# Patient Record
Sex: FEMALE | Race: BLACK OR AFRICAN AMERICAN | NOT HISPANIC OR LATINO | Employment: FULL TIME
[De-identification: names, ages, dates, MRNs, and addresses within clinical notes are randomized per-mention and may not be internally consistent; named-entity substitution may affect disease eponyms.]

---

## 2017-07-08 ENCOUNTER — HEALTH MAINTENANCE LETTER (OUTPATIENT)
Age: 37
End: 2017-07-08

## 2018-07-15 ENCOUNTER — HEALTH MAINTENANCE LETTER (OUTPATIENT)
Age: 38
End: 2018-07-15

## 2019-10-04 ENCOUNTER — HEALTH MAINTENANCE LETTER (OUTPATIENT)
Age: 39
End: 2019-10-04

## 2020-11-08 ENCOUNTER — HEALTH MAINTENANCE LETTER (OUTPATIENT)
Age: 40
End: 2020-11-08

## 2021-09-12 ENCOUNTER — HEALTH MAINTENANCE LETTER (OUTPATIENT)
Age: 41
End: 2021-09-12

## 2022-01-01 ENCOUNTER — HEALTH MAINTENANCE LETTER (OUTPATIENT)
Age: 42
End: 2022-01-01

## 2022-10-30 ENCOUNTER — HEALTH MAINTENANCE LETTER (OUTPATIENT)
Age: 42
End: 2022-10-30

## 2023-04-08 ENCOUNTER — HEALTH MAINTENANCE LETTER (OUTPATIENT)
Age: 43
End: 2023-04-08

## 2023-08-20 ENCOUNTER — HOSPITAL ENCOUNTER (EMERGENCY)
Facility: CLINIC | Age: 43
Discharge: HOME | End: 2023-08-20
Attending: NURSE PRACTITIONER | Admitting: NURSE PRACTITIONER
Payer: COMMERCIAL

## 2023-08-20 VITALS
RESPIRATION RATE: 18 BRPM | TEMPERATURE: 97.7 F | OXYGEN SATURATION: 99 % | SYSTOLIC BLOOD PRESSURE: 108 MMHG | DIASTOLIC BLOOD PRESSURE: 69 MMHG | HEART RATE: 85 BPM | WEIGHT: 162 LBS

## 2023-08-20 DIAGNOSIS — D50.9 IRON DEFICIENCY ANEMIA, UNSPECIFIED IRON DEFICIENCY ANEMIA TYPE: ICD-10-CM

## 2023-08-20 LAB
ABO/RH(D): NORMAL
ALBUMIN SERPL BCG-MCNC: 3.9 G/DL (ref 3.5–5.2)
ALP SERPL-CCNC: 67 U/L (ref 35–104)
ALT SERPL W P-5'-P-CCNC: 111 U/L (ref 0–50)
ANION GAP SERPL CALCULATED.3IONS-SCNC: 9 MMOL/L (ref 7–15)
ANTIBODY SCREEN: NEGATIVE
AST SERPL W P-5'-P-CCNC: 158 U/L (ref 0–45)
BASOPHILS # BLD AUTO: 0 10E3/UL (ref 0–0.2)
BASOPHILS NFR BLD AUTO: 1 %
BILIRUB SERPL-MCNC: 0.4 MG/DL
BUN SERPL-MCNC: 4.9 MG/DL (ref 6–20)
CALCIUM SERPL-MCNC: 9.4 MG/DL (ref 8.6–10)
CHLORIDE SERPL-SCNC: 105 MMOL/L (ref 98–107)
CREAT SERPL-MCNC: 0.72 MG/DL (ref 0.51–0.95)
DACRYOCYTES BLD QL SMEAR: SLIGHT
DEPRECATED HCO3 PLAS-SCNC: 27 MMOL/L (ref 22–29)
EOSINOPHIL # BLD AUTO: 0.1 10E3/UL (ref 0–0.7)
EOSINOPHIL NFR BLD AUTO: 3 %
ERYTHROCYTE [DISTWIDTH] IN BLOOD BY AUTOMATED COUNT: 22.7 % (ref 10–15)
FERRITIN SERPL-MCNC: 9 NG/ML (ref 6–175)
FOLATE SERPL-MCNC: NORMAL NG/ML
GFR SERPL CREATININE-BSD FRML MDRD: >90 ML/MIN/1.73M2
GLUCOSE SERPL-MCNC: 111 MG/DL (ref 70–99)
HCT VFR BLD AUTO: 22.3 % (ref 35–47)
HEMOCCULT STL QL: NEGATIVE
HGB BLD-MCNC: 6.3 G/DL (ref 11.7–15.7)
HOLD SPECIMEN: NORMAL
IMM GRANULOCYTES # BLD: 0 10E3/UL
IMM GRANULOCYTES NFR BLD: 1 %
INR PPP: 0.99 (ref 0.85–1.15)
IRON BINDING CAPACITY (ROCHE): 367 UG/DL (ref 240–430)
IRON SATN MFR SERPL: 4 % (ref 15–46)
IRON SERPL-MCNC: 16 UG/DL (ref 37–145)
LIPASE SERPL-CCNC: 155 U/L (ref 13–60)
LYMPHOCYTES # BLD AUTO: 1 10E3/UL (ref 0.8–5.3)
LYMPHOCYTES NFR BLD AUTO: 26 %
MAGNESIUM SERPL-MCNC: 1.5 MG/DL (ref 1.7–2.3)
MCH RBC QN AUTO: 15.7 PG (ref 26.5–33)
MCHC RBC AUTO-ENTMCNC: 28.3 G/DL (ref 31.5–36.5)
MCV RBC AUTO: 56 FL (ref 78–100)
MONOCYTES # BLD AUTO: 0.6 10E3/UL (ref 0–1.3)
MONOCYTES NFR BLD AUTO: 16 %
NEUTROPHILS # BLD AUTO: 2 10E3/UL (ref 1.6–8.3)
NEUTROPHILS NFR BLD AUTO: 53 %
NRBC # BLD AUTO: 0 10E3/UL
NRBC BLD AUTO-RTO: 1 /100
PLAT MORPH BLD: ABNORMAL
PLATELET # BLD AUTO: 187 10E3/UL (ref 150–450)
POTASSIUM SERPL-SCNC: 3.7 MMOL/L (ref 3.4–5.3)
PROT SERPL-MCNC: 6.5 G/DL (ref 6.4–8.3)
RBC # BLD AUTO: 4.01 10E6/UL (ref 3.8–5.2)
RBC MORPH BLD: ABNORMAL
RETICS # AUTO: 0.07 10E6/UL (ref 0.03–0.1)
RETICS/RBC NFR AUTO: 1.7 % (ref 0.5–2)
SODIUM SERPL-SCNC: 141 MMOL/L (ref 136–145)
SPECIMEN EXPIRATION DATE: NORMAL
TARGETS BLD QL SMEAR: ABNORMAL
TRANSFERRIN SERPL-MCNC: 307 MG/DL (ref 200–360)
VIT B12 SERPL-MCNC: 1299 PG/ML (ref 232–1245)
WBC # BLD AUTO: 3.6 10E3/UL (ref 4–11)

## 2023-08-20 PROCEDURE — 85610 PROTHROMBIN TIME: CPT | Performed by: NURSE PRACTITIONER

## 2023-08-20 PROCEDURE — 86850 RBC ANTIBODY SCREEN: CPT | Performed by: NURSE PRACTITIONER

## 2023-08-20 PROCEDURE — 83690 ASSAY OF LIPASE: CPT | Performed by: NURSE PRACTITIONER

## 2023-08-20 PROCEDURE — 85025 COMPLETE CBC W/AUTO DIFF WBC: CPT | Performed by: NURSE PRACTITIONER

## 2023-08-20 PROCEDURE — 36415 COLL VENOUS BLD VENIPUNCTURE: CPT | Performed by: NURSE PRACTITIONER

## 2023-08-20 PROCEDURE — 83735 ASSAY OF MAGNESIUM: CPT | Performed by: NURSE PRACTITIONER

## 2023-08-20 PROCEDURE — 82728 ASSAY OF FERRITIN: CPT | Performed by: NURSE PRACTITIONER

## 2023-08-20 PROCEDURE — 83550 IRON BINDING TEST: CPT | Performed by: NURSE PRACTITIONER

## 2023-08-20 PROCEDURE — 85045 AUTOMATED RETICULOCYTE COUNT: CPT | Performed by: NURSE PRACTITIONER

## 2023-08-20 PROCEDURE — 258N000003 HC RX IP 258 OP 636: Performed by: NURSE PRACTITIONER

## 2023-08-20 PROCEDURE — 82272 OCCULT BLD FECES 1-3 TESTS: CPT | Performed by: NURSE PRACTITIONER

## 2023-08-20 PROCEDURE — 36415 COLL VENOUS BLD VENIPUNCTURE: CPT | Performed by: STUDENT IN AN ORGANIZED HEALTH CARE EDUCATION/TRAINING PROGRAM

## 2023-08-20 PROCEDURE — 96365 THER/PROPH/DIAG IV INF INIT: CPT

## 2023-08-20 PROCEDURE — 82746 ASSAY OF FOLIC ACID SERUM: CPT | Performed by: NURSE PRACTITIONER

## 2023-08-20 PROCEDURE — 99284 EMERGENCY DEPT VISIT MOD MDM: CPT | Performed by: EMERGENCY MEDICINE

## 2023-08-20 PROCEDURE — 80053 COMPREHEN METABOLIC PANEL: CPT | Performed by: NURSE PRACTITIONER

## 2023-08-20 PROCEDURE — 82607 VITAMIN B-12: CPT | Performed by: NURSE PRACTITIONER

## 2023-08-20 PROCEDURE — 84466 ASSAY OF TRANSFERRIN: CPT | Performed by: NURSE PRACTITIONER

## 2023-08-20 PROCEDURE — 99284 EMERGENCY DEPT VISIT MOD MDM: CPT | Mod: 25

## 2023-08-20 PROCEDURE — 250N000011 HC RX IP 250 OP 636: Mod: JZ | Performed by: NURSE PRACTITIONER

## 2023-08-20 RX ORDER — FERROUS SULFATE 325(65) MG
325 TABLET ORAL
Qty: 30 TABLET | Refills: 1 | Status: SHIPPED | OUTPATIENT
Start: 2023-08-20 | End: 2024-02-07

## 2023-08-20 RX ORDER — DIPHENHYDRAMINE HYDROCHLORIDE 50 MG/ML
50 INJECTION INTRAMUSCULAR; INTRAVENOUS
Status: DISCONTINUED | OUTPATIENT
Start: 2023-08-20 | End: 2023-08-20 | Stop reason: HOSPADM

## 2023-08-20 RX ORDER — METHYLPREDNISOLONE SODIUM SUCCINATE 125 MG/2ML
125 INJECTION, POWDER, LYOPHILIZED, FOR SOLUTION INTRAMUSCULAR; INTRAVENOUS
Status: DISCONTINUED | OUTPATIENT
Start: 2023-08-20 | End: 2023-08-20 | Stop reason: HOSPADM

## 2023-08-20 RX ADMIN — IRON SUCROSE 200 MG: 20 INJECTION, SOLUTION INTRAVENOUS at 14:36

## 2023-08-20 ASSESSMENT — ACTIVITIES OF DAILY LIVING (ADL)
ADLS_ACUITY_SCORE: 35
ADLS_ACUITY_SCORE: 35

## 2023-08-20 NOTE — ED TRIAGE NOTES
Sent from [facility] with reports of hgb 6.5 , pt denies any symptoms     Triage Assessment       Row Name 08/20/23 1033       Triage Assessment (Adult)    Airway WDL WDL       Respiratory WDL    Respiratory WDL WDL       Skin Circulation/Temperature WDL    Skin Circulation/Temperature WDL WDL       Cardiac WDL    Cardiac WDL WDL       Peripheral/Neurovascular WDL    Peripheral Neurovascular WDL WDL       Cognitive/Neuro/Behavioral WDL    Cognitive/Neuro/Behavioral WDL WDL

## 2023-08-20 NOTE — ED PROVIDER NOTES
History     Chief Complaint   Patient presents with    Abnormal Labs     HPI  Tasneem Hogan is a 43 year old female with history of interstitial lung disease, sarcoidosis, and sickle cell trait who presents from [facility] for evaluation of severe anemia.  Patient was admitted to [facility] on yesterday for chemical dependency treatment for alcohol.  Her last drink was on yesterday.  She has not had any complications or withdrawal. She had routine labs done that showed a hemoglobin of 6.5, MCV 60.1, normal platelets (200). She is asymptomatic with this.     Denies feeling lightheaded.  Denies chest pain or shortness of breath. Denies headaches.  Denies fatigue. Denies abdominal pain.  Denies black or bloody stool.  Denies nausea or vomiting or bloody emesis.    Patient has not seen a doctor since 2012. Hgb back then ranged from 10.5 - 12.2 (microcytic anemia). Per patient, she has previously been on oral iron supplement but has not been on iron in many years.     Allergies:  Allergies   Allergen Reactions    Nkda [No Known Drug Allergy]        Problem List:    Patient Active Problem List    Diagnosis Date Noted    Iron deficiency anemia, unspecified iron deficiency anemia type 08/20/2023     Priority: Medium    S/P LEEP of cervix 07/26/2012     Priority: Medium     2009      Sarcoidosis 04/12/2011     Priority: Medium     With lung nodules on CT, improving on last CT 2011      CARDIOVASCULAR SCREENING; LDL GOAL LESS THAN 160 05/09/2010     Priority: Medium    HSIL (high grade squamous intraepithelial lesion) on Pap smear      Priority: Medium     pap reread-- agree with initial read.  pap repeated 3-09 (6 months later): lgsil  colp: lesions, bx-- chronic cervicitis.  Referral to gyn  9/08: HSIL   3/09: LSIL cannot exclude HSIL ,3/09 colpo with bx showed all negative   10/09 colpo showed MICHAEL II-III , 11/09 LEEP with negative margins   6/10: normal pap  7/7/11: Pap - Nil.   Plan pap in 1 yr.  2012: NIL pap.  Plan pap in 1 yr.      Problem list name updated by automated process. Provider to review and confirm      Wheeze 2008     Priority: Medium        Past Medical History:    Past Medical History:   Diagnosis Date    HSIL (high grade squamous intraepithelial lesion) on Pap smear     Normal delivery 2000    Sarcoidosis     Sickle-cell trait (H)     Tobacco abuse 2008    Unspecified chlamydial infection, in conditions classified elsewhere and of unspecified site 3-01    Unspecified inflammatory disease of female pelvic organs and tissues 3-01       Past Surgical History:    Past Surgical History:   Procedure Laterality Date    LEEP TX, CERVICAL  2009    harriet II & III    ZC NONSPECIFIC PROCEDURE      - 2 children Male 12-, Female '       Family History:    Family History   Problem Relation Age of Onset    Cerebrovascular Disease Father        Social History:  Marital Status:  Single [1]  Social History     Tobacco Use    Smoking status: Former     Packs/day: 1.00     Types: Cigarettes     Quit date: 2012     Years since quittin.2    Smokeless tobacco: Never   Substance Use Topics    Alcohol use: Yes     Comment: Approx 1-2 times a week    Drug use: Yes        Medications:    ferrous sulfate (FEROSUL) 325 (65 Fe) MG tablet  albuterol (PROAIR HFA) 108 (90 BASE) MCG/ACT inhaler  Mometasone Furo-Formoterol Fum (DULERA IN)        Review of Systems  As mentioned above in the history present illness. All other systems were reviewed and are negative.    Physical Exam   BP: 103/59  Pulse: 92  Temp: 97.7  F (36.5  C)  Resp: 18  Weight: 73.5 kg (162 lb)  SpO2: 100 %      Physical Exam  Constitutional:       General: She is not in acute distress.     Appearance: Normal appearance. She is well-developed. She is not ill-appearing.   HENT:      Head: Normocephalic and atraumatic.      Right Ear: External ear normal.      Left Ear: External ear normal.       Nose: Nose normal.      Mouth/Throat:      Mouth: Mucous membranes are moist.   Eyes:      Conjunctiva/sclera: Conjunctivae normal.   Cardiovascular:      Rate and Rhythm: Normal rate and regular rhythm.      Heart sounds: Normal heart sounds. No murmur heard.  Pulmonary:      Effort: Pulmonary effort is normal. No respiratory distress.      Breath sounds: Normal breath sounds.   Abdominal:      General: Bowel sounds are normal. There is no distension.      Palpations: Abdomen is soft.      Tenderness: There is no abdominal tenderness.   Genitourinary:     Rectum: Guaiac result negative. No tenderness, anal fissure or external hemorrhoid.   Musculoskeletal:         General: Normal range of motion.   Skin:     General: Skin is warm and dry.      Coloration: Skin is pale.      Findings: No rash.   Neurological:      General: No focal deficit present.      Mental Status: She is alert and oriented to person, place, and time.         ED Course              ED Course as of 08/20/23 1511   Sun Aug 20, 2023   1311 Notified of Hgb 6.3   1346 Spoke with Vitor to give update.       Procedures              Results for orders placed or performed during the hospital encounter of 08/20/23 (from the past 24 hour(s))   [place] Draw    Narrative    The following orders were created for panel order [place] Draw.  Procedure                               Abnormality         Status                     ---------                               -----------         ------                     Extra Blue Top Tube[[id number]]                              Final result               Extra Green Top (Lithium...[[id number]]                      Final result               Extra Purple Top Tube[[id number]]                            Final result               Extra Blood Bank Purple ...[[id number]]                      Final result                 Please view results for these tests on the individual orders.   Extra Blue Top Tube   Result Value Ref  Range    Hold Specimen JIC    Extra Green Top (Lithium Heparin) Tube   Result Value Ref Range    Hold Specimen JIC    Extra Purple Top Tube   Result Value Ref Range    Hold Specimen JIC    Extra Blood Bank Purple Top Tube   Result Value Ref Range    Hold Specimen JIC    CBC with platelets differential    Narrative    The following orders were created for panel order CBC with platelets differential.  Procedure                               Abnormality         Status                     ---------                               -----------         ------                     CBC with platelets and d...[[id number]]  Abnormal            Final result               RBC and Platelet Morphology[[id number]]  Abnormal            Final result                 Please view results for these tests on the individual orders.   Comprehensive metabolic panel   Result Value Ref Range    Sodium 141 136 - 145 mmol/L    Potassium 3.7 3.4 - 5.3 mmol/L    Chloride 105 98 - 107 mmol/L    Carbon Dioxide (CO2) 27 22 - 29 mmol/L    Anion Gap 9 7 - 15 mmol/L    Urea Nitrogen 4.9 (L) 6.0 - 20.0 mg/dL    Creatinine 0.72 0.51 - 0.95 mg/dL    Calcium 9.4 8.6 - 10.0 mg/dL    Glucose 111 (H) 70 - 99 mg/dL    Alkaline Phosphatase 67 35 - 104 U/L     (H) 0 - 45 U/L     (H) 0 - 50 U/L    Protein Total 6.5 6.4 - 8.3 g/dL    Albumin 3.9 3.5 - 5.2 g/dL    Bilirubin Total 0.4 <=1.2 mg/dL    GFR Estimate >90 >60 mL/min/1.73m2   ABO/Rh type and screen    Narrative    The following orders were created for panel order ABO/Rh type and screen.  Procedure                               Abnormality         Status                     ---------                               -----------         ------                     Adult Type and Screen[[id number]]                            Edited Result - FINAL        Please view results for these tests on the individual orders.   INR   Result Value Ref Range    INR 0.99 0.85 - 1.15   Lipase   Result Value Ref Range     Lipase 155 (H) 13 - 60 U/L   Iron and iron binding capacity   Result Value Ref Range    Iron 16 (L) 37 - 145 ug/dL    Iron Binding Capacity 367 240 - 430 ug/dL    Iron Sat Index 4 (L) 15 - 46 %   Reticulocyte count   Result Value Ref Range    % Reticulocyte 1.7 0.5 - 2.0 %    Absolute Reticulocyte 0.069 0.025 - 0.095 10e6/uL   Ferritin   Result Value Ref Range    Ferritin 9 6 - 175 ng/mL   Magnesium   Result Value Ref Range    Magnesium 1.5 (L) 1.7 - 2.3 mg/dL   CBC with platelets and differential   Result Value Ref Range    WBC Count 3.6 (L) 4.0 - 11.0 10e3/uL    RBC Count 4.01 3.80 - 5.20 10e6/uL    Hemoglobin 6.3 (LL) 11.7 - 15.7 g/dL    Hematocrit 22.3 (L) 35.0 - 47.0 %    MCV 56 (L) 78 - 100 fL    MCH 15.7 (L) 26.5 - 33.0 pg    MCHC 28.3 (L) 31.5 - 36.5 g/dL    RDW 22.7 (H) 10.0 - 15.0 %    Platelet Count 187 150 - 450 10e3/uL    % Neutrophils 53 %    % Lymphocytes 26 %    % Monocytes 16 %    % Eosinophils 3 %    % Basophils 1 %    % Immature Granulocytes 1 %    NRBCs per 100 WBC 1 (H) <1 /100    Absolute Neutrophils 2.0 1.6 - 8.3 10e3/uL    Absolute Lymphocytes 1.0 0.8 - 5.3 10e3/uL    Absolute Monocytes 0.6 0.0 - 1.3 10e3/uL    Absolute Eosinophils 0.1 0.0 - 0.7 10e3/uL    Absolute Basophils 0.0 0.0 - 0.2 10e3/uL    Absolute Immature Granulocytes 0.0 <=0.4 10e3/uL    Absolute NRBCs 0.0 10e3/uL   Adult Type and Screen   Result Value Ref Range    ABO/RH(D) A POS     Antibody Screen Negative Negative    SPECIMEN EXPIRATION DATE [id number]    RBC and Platelet Morphology   Result Value Ref Range    Platelet Assessment  Automated Count Confirmed. Platelet morphology is normal.     Automated Count Confirmed. Platelet morphology is normal.    Target Cells Marked (A) None Seen    Teardrop Cells Slight (A) None Seen    RBC Morphology Confirmed RBC Indices    Occult blood stool   Result Value Ref Range    Occult Blood Negative Negative       Medications   EPINEPHrine (ADRENALIN) kit 0.3 mg (has no administration in  time range)   diphenhydrAMINE (BENADRYL) injection 50 mg (has no administration in time range)   methylPREDNISolone sodium succinate (solu-MEDROL) injection 125 mg (has no administration in time range)   famotidine (PEPCID) injection 20 mg (has no administration in time range)   iron sucrose (VENOFER) 200 mg in sodium chloride 0.9 % 120 mL intermittent infusion (0 mg Intravenous Stopped 8/20/23 7132)       Assessments & Plan (with Medical Decision Making)  I consulted with Dr. Ortega, emergency physician, regarding the work-up and management of this patient.   43 year old female with history of interstitial lung disease, sarcoidosis, and sickle cell trait who presents from [facility] for evaluation of severe anemia.  Patient was admitted to [facility] on Friday for chemical dependency treatment for alcohol.  Her last drink was on Friday.  She has not had any complications or withdrawal. She had routine labs done that showed a hemoglobin of 6.5, MCV 60.1, normal platelets (200). She is asymptomatic with this.     Denies feeling lightheaded.  Denies chest pain or shortness of breath. Denies headaches.  Denies fatigue. Denies abdominal pain.  Denies black or bloody stool.  Denies nausea or vomiting or bloody emesis.    Patient has not seen a doctor since 2012. Hgb back then ranged from 10.5 - 12.2 (microcytic anemia). Per patient, she has previously been on oral iron supplement but has not been on iron in many years.     Patient is alert and oriented.  She does not appear in any distress.  She is pale.  Normotensive.  No tachycardia.  She is afebrile.  No concerning exam findings.      Pertinent lab findings:    WBC 3.6  Hemoglobin 6.3, MCV 56, marked target cells, slight teardrop cells  Platelets normal.  Magnesium low at 1.5. (she is getting magnesium supplementation at [facility])  Iron is low at 16 and iron saturation index is low at 4  -- Normal ferritin, normal retic count  Mild  elevation of , and , lipase 155 (likely related to her alcohol abuse)  --normal INR      I consulted with hematology at [facility], Dr. Greri Khan. Given patient is asymptomatic there is no strong indication for blood transfusion.  However, he recommends iron infusions (Venofer 200 mg for 5 doses).  If we can give her the first dose here in the emergency department and then have her get set up for additional doses outpatient would be ideal.  Also, instructed to add B12 and folate to her lab work-up.  She should have follow-up with hematology in the next 2 weeks.    Transferrin, B12, and Folate result is pending at time of discharge.    Patient received IV Venofer 200 mg here today.  She tolerated this very well with no adverse reaction.    Plan:  You were given an iron infusion (Venofer) here today.  Start ferrous sulfate 325 mg daily.    You are going to need additional iron infusions over the next couple weeks, for a total of 5 infusions.    Make a clinic appointment this week so they can assist in placing orders for iron infusion to be done at the infusion therapy clinic.  Call [phone] to scheduled clinic appointment.    Schedule follow-up appointment with hematology.  Referral sent.  [phone]     New Prescriptions    FERROUS SULFATE (FEROSUL) 325 (65 FE) MG TABLET    Take 1 tablet (325 mg) by mouth daily (with breakfast)       Final diagnoses:   Iron deficiency anemia, unspecified iron deficiency anemia type       8/20/2023   [facility] EMERGENCY DEPT       Felisa, Naye DiazFRANCA CNP  08/20/23 2321    ED Attending Physician Attestation   I, Steve Ortega MD evaluated and cared for the patient as part of a shared visit with the Advanced Practice Provider (ISABEL). I have performed a history and physical examination of the patient independent of the ISABEL. I reviewed the ISABEL's documentation above and agree with the documented findings and plan of care. I personally  provided a substantive portion of the care for this patient, including the complete Medical Decision Making. Please see the ISABEL's documentation for full details.  I have reviewed and discussed with the advanced practice provider their history, physical and plan.  We reviewed the patient's evaluation, medical decision making and treatment plan. I personally reviewed the vital signs, medications, and labs.  I spent 10 minutes face-to-face and/or coordinating care. Over 50% of our time on the unit was spent counseling the patient and/or coordinating care regarding her diagnosis and treatment.       Summary of HPI, PE, ED Course  My key history or physical exam findings:   43 year old female with history of sickle cell trait who presents from [facility] for evaluation of severe anemia with routine admission labs showing Hgb 6.5, MCV 60.1. Asymptomatic. No primary care since 2012 when Hgb was 10.5 - 12.2 (microcytic anemia) and she was on an iron supplement. No iron supplement for many years. Exam notable for NL exam.   ED course notable for: Hematology consult, IV iron, deferral of transfusion.     Critical Care & Procedures  Not applicable.    Medical Decision Making  Key management decisions made by me: Hematology consult for care/treatment guidance.  Medical Decision Making The medical record was reviewed and interpreted.  Current labs reviewed and interpreted.  Previous labs reviewed and interpreted.    Disposition  After the completion of care in the emergency department, the patient was discharged.    I agree with the PA's evaluation, assessment and treatment plan.  Face to face time with the patient = 10 minutes     Steve Ortega MD  Date of Service (when I saw the patient): 8/20/23       Steve Ortega MD  08/22/23 0003

## 2023-08-21 ENCOUNTER — PATIENT OUTREACH (OUTPATIENT)
Dept: ONCOLOGY | Facility: CLINIC | Age: 43
End: 2023-08-21
Payer: COMMERCIAL

## 2023-08-21 NOTE — PROGRESS NOTES
Hematology referral reviewed for [facility], see below.    Referral reason: Anemia, seen by ED provider with below rec's from Dr Khan, currently admitted to [facility] for chemical dependency treatment.    Current abnormal labs: Available in Chart Review    Outreach: Call not placed to patient regarding referral.    Plan: Triage instructions updated and sent to NPS for completion.

## 2023-08-22 ENCOUNTER — TELEPHONE (OUTPATIENT)
Dept: EMERGENCY MEDICINE | Facility: CLINIC | Age: 43
End: 2023-08-22
Payer: COMMERCIAL

## 2023-08-22 NOTE — TELEPHONE ENCOUNTER
[facility] Emergency Department/Urgent Care Lab result notification:    Reason for call  Call to [facility] to obtain nursing department fax number to forward lab results  Can fax results to [facility] per [place], [phone]    Faxed labs.    Joslyn Dunn, RN  Customer Service Center Result RN  [facility] Emergency Dept Lab Result RN  Ph# [phone]

## 2023-09-07 NOTE — TELEPHONE ENCOUNTER
RECORDS STATUS - ALL OTHER DIAGNOSIS      RECORDS RECEIVED FROM: Epic   DATE RECEIVED:    NOTES STATUS DETAILS   OFFICE NOTE from referring provider Epic 8/20/23: FRANCA Lagunas CNP   DISCHARGE REPORT from the ER [facility] 8/20/23: [facility] [place] [facility]   MEDICATION LIST [facility]    LABS     ANYTHING RELATED TO DIAGNOSIS Epic

## 2023-09-20 ENCOUNTER — PRE VISIT (OUTPATIENT)
Dept: ONCOLOGY | Facility: CLINIC | Age: 43
End: 2023-09-20
Payer: COMMERCIAL

## 2023-09-20 ENCOUNTER — ONCOLOGY VISIT (OUTPATIENT)
Dept: ONCOLOGY | Facility: CLINIC | Age: 43
End: 2023-09-20
Attending: NURSE PRACTITIONER
Payer: COMMERCIAL

## 2023-09-20 ENCOUNTER — LAB (OUTPATIENT)
Dept: INFUSION THERAPY | Facility: CLINIC | Age: 43
End: 2023-09-20
Attending: INTERNAL MEDICINE
Payer: COMMERCIAL

## 2023-09-20 VITALS
HEART RATE: 67 BPM | HEIGHT: 66 IN | DIASTOLIC BLOOD PRESSURE: 73 MMHG | BODY MASS INDEX: 27 KG/M2 | OXYGEN SATURATION: 100 % | SYSTOLIC BLOOD PRESSURE: 110 MMHG | WEIGHT: 168 LBS | RESPIRATION RATE: 16 BRPM

## 2023-09-20 DIAGNOSIS — D50.9 MICROCYTIC ANEMIA: ICD-10-CM

## 2023-09-20 DIAGNOSIS — D50.9 MICROCYTIC ANEMIA: Primary | ICD-10-CM

## 2023-09-20 DIAGNOSIS — D50.9 IRON DEFICIENCY ANEMIA, UNSPECIFIED IRON DEFICIENCY ANEMIA TYPE: ICD-10-CM

## 2023-09-20 LAB
ALBUMIN SERPL BCG-MCNC: 4.3 G/DL (ref 3.5–5.2)
ALP SERPL-CCNC: 53 U/L (ref 35–104)
ALT SERPL W P-5'-P-CCNC: <5 U/L (ref 0–50)
ANION GAP SERPL CALCULATED.3IONS-SCNC: 9 MMOL/L (ref 7–15)
AST SERPL W P-5'-P-CCNC: 21 U/L (ref 0–45)
BASOPHILS # BLD AUTO: 0 10E3/UL (ref 0–0.2)
BASOPHILS NFR BLD AUTO: 1 %
BILIRUB SERPL-MCNC: 0.2 MG/DL
BUN SERPL-MCNC: 6.5 MG/DL (ref 6–20)
CALCIUM SERPL-MCNC: 9.4 MG/DL (ref 8.6–10)
CHLORIDE SERPL-SCNC: 105 MMOL/L (ref 98–107)
CREAT SERPL-MCNC: 0.84 MG/DL (ref 0.51–0.95)
DACRYOCYTES BLD QL SMEAR: SLIGHT
DEPRECATED HCO3 PLAS-SCNC: 25 MMOL/L (ref 22–29)
EGFRCR SERPLBLD CKD-EPI 2021: 87 ML/MIN/1.73M2
EOSINOPHIL # BLD AUTO: 0.2 10E3/UL (ref 0–0.7)
EOSINOPHIL NFR BLD AUTO: 4 %
ERYTHROCYTE [DISTWIDTH] IN BLOOD BY AUTOMATED COUNT: 27.8 % (ref 10–15)
FERRITIN SERPL-MCNC: 43 NG/ML (ref 6–175)
GLUCOSE SERPL-MCNC: 92 MG/DL (ref 70–99)
HCT VFR BLD AUTO: 36.9 % (ref 35–47)
HGB BLD-MCNC: 11.1 G/DL (ref 11.7–15.7)
IMM GRANULOCYTES # BLD: 0 10E3/UL
IMM GRANULOCYTES NFR BLD: 0 %
LYMPHOCYTES # BLD AUTO: 1.1 10E3/UL (ref 0.8–5.3)
LYMPHOCYTES NFR BLD AUTO: 25 %
MCH RBC QN AUTO: 20.9 PG (ref 26.5–33)
MCHC RBC AUTO-ENTMCNC: 30.1 G/DL (ref 31.5–36.5)
MCV RBC AUTO: 70 FL (ref 78–100)
MONOCYTES # BLD AUTO: 0.5 10E3/UL (ref 0–1.3)
MONOCYTES NFR BLD AUTO: 13 %
NEUTROPHILS # BLD AUTO: 2.5 10E3/UL (ref 1.6–8.3)
NEUTROPHILS NFR BLD AUTO: 57 %
NRBC # BLD AUTO: 0 10E3/UL
NRBC BLD AUTO-RTO: 0 /100
PATH REPORT.COMMENTS IMP SPEC: NORMAL
PATH REPORT.COMMENTS IMP SPEC: NORMAL
PATH REPORT.FINAL DX SPEC: NORMAL
PLAT MORPH BLD: ABNORMAL
PLATELET # BLD AUTO: 307 10E3/UL (ref 150–450)
POTASSIUM SERPL-SCNC: 4.2 MMOL/L (ref 3.4–5.3)
PROT SERPL-MCNC: 7 G/DL (ref 6.4–8.3)
RBC # BLD AUTO: 5.3 10E6/UL (ref 3.8–5.2)
RBC MORPH BLD: ABNORMAL
RETICS # AUTO: 0.07 10E6/UL (ref 0.01–0.11)
RETICS/RBC NFR AUTO: 1.4 % (ref 0.8–2.7)
SODIUM SERPL-SCNC: 139 MMOL/L (ref 136–145)
TARGETS BLD QL SMEAR: SLIGHT
WBC # BLD AUTO: 4.3 10E3/UL (ref 4–11)

## 2023-09-20 PROCEDURE — G0463 HOSPITAL OUTPT CLINIC VISIT: HCPCS | Performed by: INTERNAL MEDICINE

## 2023-09-20 PROCEDURE — 85045 AUTOMATED RETICULOCYTE COUNT: CPT

## 2023-09-20 PROCEDURE — 36415 COLL VENOUS BLD VENIPUNCTURE: CPT

## 2023-09-20 PROCEDURE — 99207 BLOOD MORPHOLOGY PATHOLOGIST REVIEW: CPT | Performed by: PATHOLOGY

## 2023-09-20 PROCEDURE — 99204 OFFICE O/P NEW MOD 45 MIN: CPT | Performed by: INTERNAL MEDICINE

## 2023-09-20 PROCEDURE — 84155 ASSAY OF PROTEIN SERUM: CPT

## 2023-09-20 PROCEDURE — 85025 COMPLETE CBC W/AUTO DIFF WBC: CPT

## 2023-09-20 PROCEDURE — 82728 ASSAY OF FERRITIN: CPT

## 2023-09-20 RX ORDER — NALTREXONE HYDROCHLORIDE 50 MG/1
50 TABLET, FILM COATED ORAL AT BEDTIME
COMMUNITY
Start: 2023-09-12 | End: 2024-01-04

## 2023-09-20 ASSESSMENT — ENCOUNTER SYMPTOMS
PSYCHIATRIC NEGATIVE: 1
HEMATOLOGIC/LYMPHATIC NEGATIVE: 1
CARDIOVASCULAR NEGATIVE: 1
NEUROLOGICAL NEGATIVE: 1
SHORTNESS OF BREATH: 1
CONSTIPATION: 1
COUGH: 1
EYES NEGATIVE: 1
CONSTITUTIONAL NEGATIVE: 1
MUSCULOSKELETAL NEGATIVE: 1

## 2023-09-20 ASSESSMENT — PAIN SCALES - GENERAL: PAINLEVEL: NO PAIN (0)

## 2023-09-20 NOTE — PROGRESS NOTES
"Assessment & Plan   Microcytic anemia with low iiron indices  Known sickle trait  Microcytosis has been lifelong, may have a co-existing alpha thalassemia  Ongoing tobacco use disorder  Alcohol use disorder    Continue oral iron for total of 6 months  Recheck labs in February  Congratulated on alcohol abstinence  Encouraged to reduce or quit smoking    History  This is an initial hematology consultation visit for this recovering alcoholic with recent exacerbation of microcytosis with severe anemia.  She has now been taking oral iron for about a month and is tolerating that with expected adverse effect of constipation.    She was in [facility] for residential treatment of alcoholism, but now lives in a \"sober house\" in [place].    Patient Active Problem List   Diagnosis    Wheeze    HSIL (high grade squamous intraepithelial lesion) on Pap smear    CARDIOVASCULAR SCREENING; LDL GOAL LESS THAN 160    Sarcoidosis    S/P LEEP of cervix    Iron deficiency anemia, unspecified iron deficiency anemia type     Current Outpatient Medications   Medication    ferrous sulfate (FEROSUL) 325 (65 Fe) MG tablet    naltrexone (DEPADE/REVIA) 50 MG tablet    albuterol (PROAIR HFA) 108 (90 BASE) MCG/ACT inhaler    Mometasone Furo-Formoterol Fum (DULERA IN)     No current facility-administered medications for this visit.     Past Medical History:   Diagnosis Date    HSIL (high grade squamous intraepithelial lesion) on Pap smear 9-08    leep 11/09 Harriet II & III    Normal delivery 12-    Sarcoidosis 2010    Sickle-cell trait (H)     Tobacco abuse 9/24/2008    Unspecified chlamydial infection, in conditions classified elsewhere and of unspecified site 3-01    Unspecified inflammatory disease of female pelvic organs and tissues 3-01     Past Surgical History:   Procedure Laterality Date    LEEP TX, CERVICAL  2009    harriet II & III    [facility] NONSPECIFIC PROCEDURE      - 2 children Male 12-00, Female '93     Socioeconomic History    " "Marital status: Single     Spouse name: FERNANDA ARGUETA    Number of children: 2   Occupational History    Occupation:      Review of Systems   Constitutional: Negative.    HENT:  Negative.     Eyes: Negative.    Respiratory:  Positive for cough and shortness of breath.         Active smoker   Cardiovascular: Negative.    Gastrointestinal:  Positive for constipation.   Genitourinary: Negative.     Musculoskeletal: Negative.    Skin: Negative.    Neurological: Negative.    Hematological: Negative.    Psychiatric/Behavioral: Negative.     All other systems reviewed and are negative.      /73   Pulse 67   Resp 16   Ht 1.664 m (5' 5.5\")   Wt 76.2 kg (168 lb)   SpO2 100%   BMI 27.53 kg/m      Physical Exam  Vitals and nursing note reviewed.   Constitutional:       Appearance: Normal appearance. She is normal weight.   HENT:      Head: Normocephalic and atraumatic.      Mouth/Throat:      Mouth: Mucous membranes are moist.   Eyes:      Extraocular Movements: Extraocular movements intact.      Conjunctiva/sclera: Conjunctivae normal.      Pupils: Pupils are equal, round, and reactive to light.   Cardiovascular:      Rate and Rhythm: Normal rate and regular rhythm.      Heart sounds: Normal heart sounds.   Pulmonary:      Effort: Pulmonary effort is normal.      Breath sounds: Normal breath sounds.   Abdominal:      Palpations: Abdomen is soft. There is no mass.      Tenderness: There is no abdominal tenderness. There is no guarding.   Musculoskeletal:         General: No deformity.      Cervical back: Normal range of motion and neck supple.      Right lower leg: No edema.      Left lower leg: No edema.   Lymphadenopathy:      Cervical: No cervical adenopathy.   Skin:     General: Skin is warm and dry.   Neurological:      General: No focal deficit present.      Mental Status: She is alert and oriented to person, place, and time.      Cranial Nerves: No cranial nerve deficit.      Gait: Gait " normal.      Deep Tendon Reflexes: Reflexes normal.   Psychiatric:         Mood and Affect: Mood normal.         Behavior: Behavior normal.         Thought Content: Thought content normal.         Judgment: Judgment normal.       Recent Results (from the past 720 hour(s))   Comprehensive metabolic panel    Collection Time: 09/20/23  9:16 AM   Result Value Ref Range    Sodium 139 136 - 145 mmol/L    Potassium 4.2 3.4 - 5.3 mmol/L    Chloride 105 98 - 107 mmol/L    Carbon Dioxide (CO2) 25 22 - 29 mmol/L    Anion Gap 9 7 - 15 mmol/L    Urea Nitrogen 6.5 6.0 - 20.0 mg/dL    Creatinine 0.84 0.51 - 0.95 mg/dL    Calcium 9.4 8.6 - 10.0 mg/dL    Glucose 92 70 - 99 mg/dL    Alkaline Phosphatase 53 35 - 104 U/L    AST 21 0 - 45 U/L    ALT <5 0 - 50 U/L    Protein Total 7.0 6.4 - 8.3 g/dL    Albumin 4.3 3.5 - 5.2 g/dL    Bilirubin Total 0.2 <=1.2 mg/dL    GFR Estimate 87 >60 mL/min/1.73m2   Ferritin    Collection Time: 09/20/23  9:16 AM   Result Value Ref Range    Ferritin 43 6 - 175 ng/mL   Bld morphology pathology review    Collection Time: 09/20/23  9:16 AM   Result Value Ref Range    Final Diagnosis       Peripheral blood  - Microcytic hypochromic anemia with erythrocytosis (iron deficiency)      Comment       The clinical history has been reviewed. Although other causes of ineffective erythropoiesis should be ruled out, the features are suggestive of an underlying disorder of globin synthesis versus anemia of chronic disease, and hemoglobin studies can be performed on the current specimen, if requested. Please correlate with family history and iron studies, if appropriate.        Performing Labs       The technical component of this testing was completed at [facility] and [facility]     CBC with platelets and differential    Collection Time: 09/20/23  9:16 AM   Result Value Ref Range    WBC Count 4.3 4.0 - 11.0 10e3/uL    RBC Count 5.30 (H) 3.80 - 5.20 10e6/uL     Hemoglobin 11.1 (L) 11.7 - 15.7 g/dL    Hematocrit 36.9 35.0 - 47.0 %    MCV 70 (L) 78 - 100 fL    MCH 20.9 (L) 26.5 - 33.0 pg    MCHC 30.1 (L) 31.5 - 36.5 g/dL    RDW 27.8 (H) 10.0 - 15.0 %    Platelet Count 307 150 - 450 10e3/uL    % Neutrophils 57 %    % Lymphocytes 25 %    % Monocytes 13 %    % Eosinophils 4 %    % Basophils 1 %    % Immature Granulocytes 0 %    NRBCs per 100 WBC 0 <1 /100    Absolute Neutrophils 2.5 1.6 - 8.3 10e3/uL    Absolute Lymphocytes 1.1 0.8 - 5.3 10e3/uL    Absolute Monocytes 0.5 0.0 - 1.3 10e3/uL    Absolute Eosinophils 0.2 0.0 - 0.7 10e3/uL    Absolute Basophils 0.0 0.0 - 0.2 10e3/uL    Absolute Immature Granulocytes 0.0 <=0.4 10e3/uL    Absolute NRBCs 0.0 10e3/uL   Reticulocyte count    Collection Time: 09/20/23  9:16 AM   Result Value Ref Range    % Reticulocyte 1.4 0.8 - 2.7 %    Absolute Reticulocyte 0.073 0.010 - 0.110 10e6/uL   RBC and Platelet Morphology    Collection Time: 09/20/23  9:16 AM   Result Value Ref Range    Platelet Assessment  Automated Count Confirmed. Platelet morphology is normal.     Automated Count Confirmed. Platelet morphology is normal.    Target Cells Slight (A) None Seen    Teardrop Cells Slight (A) None Seen    RBC Morphology Confirmed RBC Indices      No results found for this or any previous visit (from the past 744 hour(s)).

## 2023-09-20 NOTE — PROGRESS NOTES
"Oncology Rooming Note    September 20, 2023 10:01 AM   Tasneem Hogan is a 43 year old female who presents for:    Chief Complaint   Patient presents with    Hematology     Iron deficiency anemia, unspecified iron deficiency anemia type     Initial Vitals: /73   Pulse 67   Resp 16   Ht 1.664 m (5' 5.5\")   Wt 76.2 kg (168 lb)   SpO2 100%   BMI 27.53 kg/m   Estimated body mass index is 27.53 kg/m  as calculated from the following:    Height as of this encounter: 1.664 m (5' 5.5\").    Weight as of this encounter: 76.2 kg (168 lb). Body surface area is 1.88 meters squared.  No Pain (0) Comment: Data Unavailable   No LMP recorded.  Allergies reviewed: Yes  Medications reviewed: Yes    Medications: Medication refills not needed today.  Pharmacy name entered into EPIC:    [id number] - [place], [place] - [address] & [facility] [place] - [place], [place] - [address]  [id number] - [place], [place] - [address] AT [facility] [address]    Clinical concerns:  new consult anemia       Piper Bellamy            "

## 2023-09-20 NOTE — LETTER
"    9/20/2023         RE: Tasneem Hogan  [address]  [place] [address]        Dear Colleague,    Thank you for referring your patient, Tasneem Hogan, to the [facility]. Please see a copy of my visit note below.    Oncology Rooming Note    September 20, 2023 10:01 AM   Tasneem Hogan is a 43 year old female who presents for:    Chief Complaint   Patient presents with     Hematology     Iron deficiency anemia, unspecified iron deficiency anemia type     Initial Vitals: /73   Pulse 67   Resp 16   Ht 1.664 m (5' 5.5\")   Wt 76.2 kg (168 lb)   SpO2 100%   BMI 27.53 kg/m   Estimated body mass index is 27.53 kg/m  as calculated from the following:    Height as of this encounter: 1.664 m (5' 5.5\").    Weight as of this encounter: 76.2 kg (168 lb). Body surface area is 1.88 meters squared.  No Pain (0) Comment: Data Unavailable   No LMP recorded.  Allergies reviewed: Yes  Medications reviewed: Yes    Medications: Medication refills not needed today.  Pharmacy name entered into [employer] [facility]:    [facility][id number] - [place], [place] - [address] AT [address] [place] [address] & [facility] [place] - [place], [place] - [address].  [facility][id number] - [place], [place] - [address] [place] [address] & [address]    Clinical concerns:  new consult anemia       Piper LUONG Allum              Assessment & Plan  Microcytic anemia with low iiron indices  Known sickle trait  Microcytosis has been lifelong, may have a co-existing alpha thalassemia  Ongoing tobacco use disorder  Alcohol use disorder    Continue oral iron for total of 6 months  Recheck labs in February  Congratulated on alcohol abstinence  Encouraged to reduce or quit smoking    History  This is an initial hematology consultation visit for this recovering alcoholic with recent exacerbation of microcytosis with severe anemia.  She has now been taking oral iron for " "about a month and is tolerating that with expected adverse effect of constipation.    She was in [facility] for residential treatment of alcoholism, but now lives in a \"sober house\" in [place].    Patient Active Problem List   Diagnosis     Wheeze     HSIL (high grade squamous intraepithelial lesion) on Pap smear     CARDIOVASCULAR SCREENING; LDL GOAL LESS THAN 160     Sarcoidosis     S/P LEEP of cervix     Iron deficiency anemia, unspecified iron deficiency anemia type     Current Outpatient Medications   Medication     ferrous sulfate (FEROSUL) 325 (65 Fe) MG tablet     naltrexone (DEPADE/REVIA) 50 MG tablet     albuterol (PROAIR HFA) 108 (90 BASE) MCG/ACT inhaler     Mometasone Furo-Formoterol Fum (DULERA IN)     No current facility-administered medications for this visit.     Past Medical History:   Diagnosis Date     HSIL (high grade squamous intraepithelial lesion) on Pap smear 9-08    leep 11/09 Harriet II & III     Normal delivery 12-     Sarcoidosis 2010     Sickle-cell trait (H)      Tobacco abuse 9/24/2008     Unspecified chlamydial infection, in conditions classified elsewhere and of unspecified site 3-01     Unspecified inflammatory disease of female pelvic organs and tissues 3-01     Past Surgical History:   Procedure Laterality Date     LEEP TX, CERVICAL  2009    harriet II & III     [facility] NONSPECIFIC PROCEDURE      - 2 children Male 12-00, Female '93     Socioeconomic History     Marital status: Single     Spouse name: FERNANDA ARGUETA     Number of children: 2   Occupational History     Occupation:      Review of Systems   Constitutional: Negative.    HENT:  Negative.     Eyes: Negative.    Respiratory:  Positive for cough and shortness of breath.         Active smoker   Cardiovascular: Negative.    Gastrointestinal:  Positive for constipation.   Genitourinary: Negative.     Musculoskeletal: Negative.    Skin: Negative.    Neurological: Negative.    Hematological: Negative.  " "  Psychiatric/Behavioral: Negative.     All other systems reviewed and are negative.      /73   Pulse 67   Resp 16   Ht 1.664 m (5' 5.5\")   Wt 76.2 kg (168 lb)   SpO2 100%   BMI 27.53 kg/m      Physical Exam  Vitals and nursing note reviewed.   Constitutional:       Appearance: Normal appearance. She is normal weight.   HENT:      Head: Normocephalic and atraumatic.      Mouth/Throat:      Mouth: Mucous membranes are moist.   Eyes:      Extraocular Movements: Extraocular movements intact.      Conjunctiva/sclera: Conjunctivae normal.      Pupils: Pupils are equal, round, and reactive to light.   Cardiovascular:      Rate and Rhythm: Normal rate and regular rhythm.      Heart sounds: Normal heart sounds.   Pulmonary:      Effort: Pulmonary effort is normal.      Breath sounds: Normal breath sounds.   Abdominal:      Palpations: Abdomen is soft. There is no mass.      Tenderness: There is no abdominal tenderness. There is no guarding.   Musculoskeletal:         General: No deformity.      Cervical back: Normal range of motion and neck supple.      Right lower leg: No edema.      Left lower leg: No edema.   Lymphadenopathy:      Cervical: No cervical adenopathy.   Skin:     General: Skin is warm and dry.   Neurological:      General: No focal deficit present.      Mental Status: She is alert and oriented to person, place, and time.      Cranial Nerves: No cranial nerve deficit.      Gait: Gait normal.      Deep Tendon Reflexes: Reflexes normal.   Psychiatric:         Mood and Affect: Mood normal.         Behavior: Behavior normal.         Thought Content: Thought content normal.         Judgment: Judgment normal.       Recent Results (from the past 720 hour(s))   Comprehensive metabolic panel    Collection Time: 09/20/23  9:16 AM   Result Value Ref Range    Sodium 139 136 - 145 mmol/L    Potassium 4.2 3.4 - 5.3 mmol/L    Chloride 105 98 - 107 mmol/L    Carbon Dioxide (CO2) 25 22 - 29 mmol/L    Anion Gap 9 7 " - 15 mmol/L    Urea Nitrogen 6.5 6.0 - 20.0 mg/dL    Creatinine 0.84 0.51 - 0.95 mg/dL    Calcium 9.4 8.6 - 10.0 mg/dL    Glucose 92 70 - 99 mg/dL    Alkaline Phosphatase 53 35 - 104 U/L    AST 21 0 - 45 U/L    ALT <5 0 - 50 U/L    Protein Total 7.0 6.4 - 8.3 g/dL    Albumin 4.3 3.5 - 5.2 g/dL    Bilirubin Total 0.2 <=1.2 mg/dL    GFR Estimate 87 >60 mL/min/1.73m2   Ferritin    Collection Time: 09/20/23  9:16 AM   Result Value Ref Range    Ferritin 43 6 - 175 ng/mL   Bld morphology pathology review    Collection Time: 09/20/23  9:16 AM   Result Value Ref Range    Final Diagnosis       Peripheral blood  - Microcytic hypochromic anemia with erythrocytosis (iron deficiency)      Comment       The clinical history has been reviewed. Although other causes of ineffective erythropoiesis should be ruled out, the features are suggestive of an underlying disorder of globin synthesis versus anemia of chronic disease, and hemoglobin studies can be performed on the current specimen, if requested. Please correlate with family history and iron studies, if appropriate.        Performing Labs       The technical component of this testing was completed at [facility] and [facility]     CBC with platelets and differential    Collection Time: 09/20/23  9:16 AM   Result Value Ref Range    WBC Count 4.3 4.0 - 11.0 10e3/uL    RBC Count 5.30 (H) 3.80 - 5.20 10e6/uL    Hemoglobin 11.1 (L) 11.7 - 15.7 g/dL    Hematocrit 36.9 35.0 - 47.0 %    MCV 70 (L) 78 - 100 fL    MCH 20.9 (L) 26.5 - 33.0 pg    MCHC 30.1 (L) 31.5 - 36.5 g/dL    RDW 27.8 (H) 10.0 - 15.0 %    Platelet Count 307 150 - 450 10e3/uL    % Neutrophils 57 %    % Lymphocytes 25 %    % Monocytes 13 %    % Eosinophils 4 %    % Basophils 1 %    % Immature Granulocytes 0 %    NRBCs per 100 WBC 0 <1 /100    Absolute Neutrophils 2.5 1.6 - 8.3 10e3/uL    Absolute Lymphocytes 1.1 0.8 - 5.3 10e3/uL    Absolute Monocytes 0.5 0.0 - 1.3  10e3/uL    Absolute Eosinophils 0.2 0.0 - 0.7 10e3/uL    Absolute Basophils 0.0 0.0 - 0.2 10e3/uL    Absolute Immature Granulocytes 0.0 <=0.4 10e3/uL    Absolute NRBCs 0.0 10e3/uL   Reticulocyte count    Collection Time: 09/20/23  9:16 AM   Result Value Ref Range    % Reticulocyte 1.4 0.8 - 2.7 %    Absolute Reticulocyte 0.073 0.010 - 0.110 10e6/uL   RBC and Platelet Morphology    Collection Time: 09/20/23  9:16 AM   Result Value Ref Range    Platelet Assessment  Automated Count Confirmed. Platelet morphology is normal.     Automated Count Confirmed. Platelet morphology is normal.    Target Cells Slight (A) None Seen    Teardrop Cells Slight (A) None Seen    RBC Morphology Confirmed RBC Indices      No results found for this or any previous visit (from the past 744 hour(s)).      Again, thank you for allowing me to participate in the care of your patient.        Sincerely,        Franny Jean MD

## 2023-10-23 NOTE — PROGRESS NOTES
"Tasneem is a 43 year old  female who presents for annual exam.     Besides routine health maintenance,  she would like to discuss general questions.  HPI:  Here for annual exam. Overdue for pap, has not been seen by GYN provider since . Would like to discuss heavy menses.   Heavy menses with regular cycles: reports for the past 8 years her menses have gotten significantly heavier. Menses is occurring every month, lasting 3-6 days but they are \"crazy heavy.\" She will soak through a large tampon or pad within an hour, will sometimes wake up in the night and has bled through clothes onto sheets. Passes quarter to dollar coin size clots. Does have more cramping with menses than she used to but it is not significant. Hx abnormal pap ( [place] showed MICHAEL II-III, s/p LEEP in ), last pap was in  which was NILM. Also has known hx of anemia, taking oral iron supplements and see oncology for management   Hx of alcohol abuse. Was recently in rehab but now living in sober house. Has been sober for 2 months.   Tobacco use: 12-20 cigarettes/day, maybe \"a pack/day on a bad day.\" Not ready to quit   Menses are regular q 28-30 days and crampy and heavy lasting 5 days.   Overdue for pap, has never had a mammogram   Menses flow: heavy and with clots.    Patient's last menstrual period was 10/14/2023..   Using none for contraception.    She is not currently considering pregnancy.    REPRODUCTIVE/GYNECOLOGIC HISTORY:  Tasneem is not sexually active with male partner(s) and is currently in monogamous relationship.   STI testing offered?  Accepted  History of abnormal Pap smear:  No  SOCIAL HISTORY  Abuse: does not report having previously been physical or sexually abused.    Do you feel safe in your environment? YES       Last PHQ-9 score on record =       10/25/2023     2:37 PM   PHQ-9 SCORE   PHQ-9 Total Score 6     Last GAD7 score on record =       10/25/2023     2:37 PM   GIL-7 SCORE   Total Score 8     Alcohol " Score = 0    HEALTH MAINTENANCE:     Overdue          Never done ADVANCE CARE PLANNING (Every 5 Years)     Never done HEPATITIS B IMMUNIZATION (1 of 3 - 3-dose series)     Never done COVID-19 Vaccine (1)     Never done HIV SCREENING (Once)     SEP 24  2009 YEARLY PREVENTIVE VISIT (Yearly)  Last completed: Sep 24, 2008     JUL 24  2013 PAP (Yearly)  Last completed:  DTAP/TDAP/TD IMMUNIZATION (2 - Td or Tdap)  Last completed: 2012     Never done PHQ-2 (once per calendar year) (Yearly, January to December)     SEP 1  2023 INFLUENZA VACCINE (1)  Last completed: Dec 12, 2012       HISTORY:  OB History    Para Term  AB Living   4 2 2 0 2 0   SAB IAB Ectopic Multiple Live Births   1 1 0 0 0      # Outcome Date GA Lbr Pavel/2nd Weight Sex Delivery Anes PTL Lv   4 IAB            3 Term 00 40w0d   M       2 SAB            1 Term 94 40w0d   F         Past Medical History:   Diagnosis Date    HSIL (high grade squamous intraepithelial lesion) on Pap smear 2008    leep  Harriet II & III    Normal delivery 2000    Osteoporosis     Sarcoidosis 2010    Sickle-cell trait (H24)     Special screening examination for chlamydial disease     Tobacco abuse 2008    Unspecified chlamydial infection, in conditions classified elsewhere and of unspecified site 2001    Unspecified inflammatory disease of female pelvic organs and tissues 2001     Past Surgical History:   Procedure Laterality Date    LEEP TX, CERVICAL  2009    harriet II & III    ZZC NONSPECIFIC PROCEDURE      - 2 children Male 12-00, Female '93     Family History   Problem Relation Age of Onset    Cerebrovascular Disease Father     Heart Disease Father     Other - See Comments Maternal Aunt         cancer     Social History     Tobacco Use    Smoking status: Every Day     Packs/day: 1     Types: Cigarettes    Smokeless tobacco: Never   Vaping Use    Vaping Use: Former  "  Substance Use Topics    Alcohol use: Not Currently     Comment: hx alcohol abuse, sober x2 months as of 10/2023    Drug use: Not Currently          Current Outpatient Medications:     ferrous sulfate (FEROSUL) 325 (65 Fe) MG tablet, Take 1 tablet (325 mg) by mouth daily (with breakfast), Disp: 30 tablet, Rfl: 1    naltrexone (DEPADE/REVIA) 50 MG tablet, Take 50 mg by mouth At Bedtime, Disp: , Rfl:     albuterol (PROAIR HFA) 108 (90 BASE) MCG/ACT inhaler, Inhale 2 puffs into the lungs every 4 hours as needed for shortness of breath / dyspnea. EVERY 4 TO 6 HOURS AS NEEDED (Patient not taking: Reported on 10/25/2023), Disp: 3 Inhaler, Rfl: 0    metroNIDAZOLE (FLAGYL) 500 MG tablet, Take 1 tablet (500 mg) by mouth 2 times daily for 7 days, Disp: 14 tablet, Rfl: 0    Mometasone Furo-Formoterol Fum (DULERA IN), Inhale 2 puffs into the lungs 2 times daily. (Patient not taking: Reported on 10/25/2023), Disp: , Rfl:     PAXLOVID, 300/100, 20 x 150 MG & 10 x 100MG therapy pack, TAKE 2 TABLETS (NIRMATRELVIR) AND TAKE 1 TABLET (RITONAVIR) BY MOUTH TWICE A DAY FOR 5 DAYS (Patient not taking: Reported on 10/25/2023), Disp: , Rfl:      Allergies   Allergen Reactions    Nkda [No Known Drug Allergy]        Past medical, surgical, social and family history were reviewed and updated in EPIC.    ROS:   Breast: Tenderness  Respiratory: Cough, Shortness of Breath, and Wheezing  Genitourinary: Cramps, Heavy Bleeding with Period, Incontinence, Night Sweats, Pelvic Pain, Spotting, and Vaginal Discharge    PHYSICAL EXAM:  BP 96/60   Ht 1.674 m (5' 5.9\")   Wt 73.5 kg (162 lb)   LMP 10/14/2023   BMI 26.23 kg/m     BMI: Body mass index is 26.23 kg/m .  Constitutional: healthy, alert and no distress  Neck: symmetrical, thyroid normal size, no masses present, no lymphadenopathy present.   Cardiovascular: RRR, no murmurs present  Respiratory: breathing unlabored, lungs CTA bilaterally  Breast:normal without masses, tenderness or nipple " discharge and no palpable axillary masses or adenopathy  Gastrointestinal: abdomen soft, non-tender, bowel sounds present  PELVIC EXAM:  Vulva: No lesions, no adenopathy, BUS WNL  Vagina: Moist, pink, discharge normal  well rugated, no lesions  Cervix:smooth, pink, no visible lesions  Uterus: Normal size, non-tender, mobile  Ovaries: No masses palpated  Rectal exam: deferred    ASSESSMENT/PLAN:    ICD-10-CM    1. Encounter for well woman exam with routine gynecological exam  Z01.419 Comprehensive metabolic panel (BMP + Alb, Alk Phos, ALT, AST, Total. Bili, TP)     Hemoglobin A1c     Comprehensive metabolic panel (BMP + Alb, Alk Phos, ALT, AST, Total. Bili, TP)     Hemoglobin A1c      2. Encounter for gynecological examination without abnormal finding  Z01.419       3. Screening for cervical cancer  Z12.4 Pap screen with HPV - recommended age 30 - 65 years      4. Screening for chlamydial disease  Z11.8 CHLAMYDIA TRACHOMATIS PCR      5. Screening for gonorrhea  Z11.3 NEISSERIA GONORRHOEA PCR      6. Screening for STD (sexually transmitted disease)  Z11.3 HIV Antigen Antibody Combo Cascade     Treponema Abs w Reflex to RPR and Titer     Hepatitis C antibody     Hepatitis C antibody      7. Menorrhagia with regular cycle  N92.0 CBC with platelets     TSH with free T4 reflex     Ferritin     Iron and iron binding capacity     US Transvaginal Pelvic Non-OB     CBC with platelets     TSH with free T4 reflex     Ferritin     Iron and iron binding capacity      8. Encounter for screening mammogram for malignant neoplasm of breast  Z12.31 *MA Screening Digital Bilateral      9. Vaginal discharge  N89.8 Multiplex Vaginal Panel by PCR     Multiplex Vaginal Panel by PCR      10. History of alcohol abuse  F10.11       11. Tobacco use  Z72.0         Results for orders placed or performed in visit on 10/25/23   HIV Antigen Antibody Combo Cascade     Status: Normal   Result Value Ref Range    HIV Antigen Antibody Combo Nonreactive  Nonreactive   Treponema Abs w Reflex to RPR and Titer     Status: Normal   Result Value Ref Range    Treponema Antibody Total Nonreactive Nonreactive   Hepatitis C antibody     Status: Normal   Result Value Ref Range    Hepatitis C Antibody Nonreactive Nonreactive    Narrative    Assay performance characteristics have not been established for newborns, infants, and children.   CBC with platelets     Status: Abnormal   Result Value Ref Range    WBC Count 4.9 4.0 - 11.0 10e3/uL    RBC Count 5.38 (H) 3.80 - 5.20 10e6/uL    Hemoglobin 12.4 11.7 - 15.7 g/dL    Hematocrit 39.3 35.0 - 47.0 %    MCV 73 (L) 78 - 100 fL    MCH 23.0 (L) 26.5 - 33.0 pg    MCHC 31.6 31.5 - 36.5 g/dL    RDW 18.6 (H) 10.0 - 15.0 %    Platelet Count 248 150 - 450 10e3/uL    Narrative    Tech Comments  Name of [facility] Ja[facility]  Laboratory Phone [phone]  What is Abnormal PLT Abn Distribution  Provider Follow Up Needed yes  If Yes, Provider Contact Name Florin Magallanes  If Yes, Provider Phone/Pager            Comprehensive metabolic panel (BMP + Alb, Alk Phos, ALT, AST, Total. Bili, TP)     Status: Abnormal   Result Value Ref Range    Sodium 138 135 - 145 mmol/L    Potassium 4.2 3.4 - 5.3 mmol/L    Carbon Dioxide (CO2) 20 (L) 22 - 29 mmol/L    Anion Gap 13 7 - 15 mmol/L    Urea Nitrogen 9.6 6.0 - 20.0 mg/dL    Creatinine 0.85 0.51 - 0.95 mg/dL    GFR Estimate 86 >60 mL/min/1.73m2    Calcium 9.8 8.6 - 10.0 mg/dL    Chloride 105 98 - 107 mmol/L    Glucose 87 70 - 99 mg/dL    Alkaline Phosphatase 73 35 - 104 U/L    AST 21 0 - 45 U/L    ALT 16 0 - 50 U/L    Protein Total 7.5 6.4 - 8.3 g/dL    Albumin 4.1 3.5 - 5.2 g/dL    Bilirubin Total 0.2 <=1.2 mg/dL   Hemoglobin A1c     Status: Normal   Result Value Ref Range    Hemoglobin A1C 5.3 0.0 - 5.6 %   TSH with free T4 reflex     Status: Normal   Result Value Ref Range    TSH 1.88 0.30 - 4.20 uIU/mL   Ferritin     Status: Normal   Result Value Ref Range    Ferritin 38 6 - 175 ng/mL   Iron and iron  binding capacity     Status: Abnormal   Result Value Ref Range    Iron 29 (L) 37 - 145 ug/dL    Iron Binding Capacity 312 240 - 430 ug/dL    Iron Sat Index 9 (L) 15 - 46 %   NEISSERIA GONORRHOEA PCR     Status: Normal    Specimen: Vagina; Swab   Result Value Ref Range    Neisseria gonorrhoeae Negative Negative   CHLAMYDIA TRACHOMATIS PCR     Status: Normal    Specimen: Vagina; Swab   Result Value Ref Range    Chlamydia trachomatis Negative Negative   Multiplex Vaginal Panel by PCR     Status: Abnormal    Specimen: Vagina; Swab   Result Value Ref Range    Bacterial Vaginosis Organism DNA Positive (A) Negative    Candida Group DNA Not Detected Not Detected    Candida glabrata / Kassy krusei DNA Not Detected Not Detected    Trichomonas vaginalis DNA Detected (A) Not Detected    Narrative    The Xpert  Xpress MVP test, performed on the [employer], is an automated, qualitative in vitro diagnostic test for the detection of DNA targets from anaerobic bacteria associated with bacterial vaginosis, Candida species associated with vulvovaginal candidiasis, and Trichomonas vaginalis. The assay uses clinician-collected and self-collected vaginal swabs from patients who are symptomatic for vaginitis/ vaginosis. The Xpert  Xpress MVP test utilizes real-time polymerase chain reaction (PCR) for the amplification of specific DNA targets and utilizes fluorogenic target-specific hybridization probes to detect and differentiate DNA. It is intended to aid in the diagnosis of vaginal infections in women with a clinical presentation consistent with bacterial vaginosis, vulvovaginal candidiasis, or trichomoniasis.   The assay targets three anaerobic microorgansims that are associated with bacterial vaginosis (BV). Other organisms that are not detected by the Xpert  Xpress MVP test have also been reported to be associated with BV. The BV organism and Candida species targets of the Xpert  Xpress MVP test can be commensal  in women; positive results must be considered in conjunction with other clinical and patient information to determine the disease status.         COUNSELING:   Reviewed preventive health counseling, as reflected in patient instructions  Special attention given to:        Regular exercise       Healthy diet/nutrition       Safe sex practices/STD prevention       Consider Hep C screening for all patients one time for ages 18-79 years       Syphilis screening for high risk patients        HIV screeninx in teen years, 1x in adult years, and at intervals if high risk   reports that she has been smoking cigarettes. She has been smoking an average of 1 pack per day. She has never used smokeless tobacco.  Tobacco Cessation Action Plan: Information offered: Patient not interested at this time    -Discussed etiologies for heavy bleeding. Recommended pelvic US for further evaluation with provider follow-up afterward to further discuss management plan.   -STI testing done today  -Iron studies, plan to follow-up with Oncology as planned   -Pap collected, will notify oh results and make follow-up plan   -Mammogram ordered, advised to schedule   -RTC for pelvic US and mammo, then again in 1 yr for annual exam       FRANCA Romano, YULISSA    30 minutes spent by me on the date of the encounter doing chart review, history and exam, documentation and further activities per the note discussing evaluation of heavy/abnormal bleeding

## 2023-10-25 ENCOUNTER — OFFICE VISIT (OUTPATIENT)
Dept: MIDWIFE SERVICES | Facility: CLINIC | Age: 43
End: 2023-10-25
Payer: COMMERCIAL

## 2023-10-25 VITALS
BODY MASS INDEX: 26.03 KG/M2 | DIASTOLIC BLOOD PRESSURE: 60 MMHG | WEIGHT: 162 LBS | SYSTOLIC BLOOD PRESSURE: 96 MMHG | HEIGHT: 66 IN

## 2023-10-25 DIAGNOSIS — N92.0 MENORRHAGIA WITH REGULAR CYCLE: ICD-10-CM

## 2023-10-25 DIAGNOSIS — Z01.419 ENCOUNTER FOR GYNECOLOGICAL EXAMINATION WITHOUT ABNORMAL FINDING: ICD-10-CM

## 2023-10-25 DIAGNOSIS — Z72.0 TOBACCO USE: ICD-10-CM

## 2023-10-25 DIAGNOSIS — Z12.31 ENCOUNTER FOR SCREENING MAMMOGRAM FOR MALIGNANT NEOPLASM OF BREAST: ICD-10-CM

## 2023-10-25 DIAGNOSIS — F10.11 HISTORY OF ALCOHOL ABUSE: ICD-10-CM

## 2023-10-25 DIAGNOSIS — Z12.4 SCREENING FOR CERVICAL CANCER: ICD-10-CM

## 2023-10-25 DIAGNOSIS — N89.8 VAGINAL DISCHARGE: ICD-10-CM

## 2023-10-25 DIAGNOSIS — Z01.419 ENCOUNTER FOR WELL WOMAN EXAM WITH ROUTINE GYNECOLOGICAL EXAM: Primary | ICD-10-CM

## 2023-10-25 DIAGNOSIS — Z11.8 SCREENING FOR CHLAMYDIAL DISEASE: ICD-10-CM

## 2023-10-25 DIAGNOSIS — Z11.3 SCREENING FOR GONORRHEA: ICD-10-CM

## 2023-10-25 DIAGNOSIS — Z11.3 SCREENING FOR STD (SEXUALLY TRANSMITTED DISEASE): ICD-10-CM

## 2023-10-25 LAB
ERYTHROCYTE [DISTWIDTH] IN BLOOD BY AUTOMATED COUNT: 18.6 % (ref 10–15)
HBA1C MFR BLD: 5.3 % (ref 0–5.6)
HCT VFR BLD AUTO: 39.3 % (ref 35–47)
HGB BLD-MCNC: 12.4 G/DL (ref 11.7–15.7)
MCH RBC QN AUTO: 23 PG (ref 26.5–33)
MCHC RBC AUTO-ENTMCNC: 31.6 G/DL (ref 31.5–36.5)
MCV RBC AUTO: 73 FL (ref 78–100)
PLATELET # BLD AUTO: 248 10E3/UL (ref 150–450)
RBC # BLD AUTO: 5.38 10E6/UL (ref 3.8–5.2)
WBC # BLD AUTO: 4.9 10E3/UL (ref 4–11)

## 2023-10-25 PROCEDURE — 99386 PREV VISIT NEW AGE 40-64: CPT | Performed by: ADVANCED PRACTICE MIDWIFE

## 2023-10-25 PROCEDURE — 83540 ASSAY OF IRON: CPT | Performed by: ADVANCED PRACTICE MIDWIFE

## 2023-10-25 PROCEDURE — G0145 SCR C/V CYTO,THINLAYER,RESCR: HCPCS | Performed by: ADVANCED PRACTICE MIDWIFE

## 2023-10-25 PROCEDURE — 36415 COLL VENOUS BLD VENIPUNCTURE: CPT | Performed by: ADVANCED PRACTICE MIDWIFE

## 2023-10-25 PROCEDURE — 83036 HEMOGLOBIN GLYCOSYLATED A1C: CPT | Performed by: ADVANCED PRACTICE MIDWIFE

## 2023-10-25 PROCEDURE — 87591 N.GONORRHOEAE DNA AMP PROB: CPT | Performed by: ADVANCED PRACTICE MIDWIFE

## 2023-10-25 PROCEDURE — 82728 ASSAY OF FERRITIN: CPT | Performed by: ADVANCED PRACTICE MIDWIFE

## 2023-10-25 PROCEDURE — 86803 HEPATITIS C AB TEST: CPT | Performed by: ADVANCED PRACTICE MIDWIFE

## 2023-10-25 PROCEDURE — 87389 HIV-1 AG W/HIV-1&-2 AB AG IA: CPT | Performed by: ADVANCED PRACTICE MIDWIFE

## 2023-10-25 PROCEDURE — 87624 HPV HI-RISK TYP POOLED RSLT: CPT | Performed by: ADVANCED PRACTICE MIDWIFE

## 2023-10-25 PROCEDURE — 0352U MULTIPLEX VAGINAL PANEL BY PCR: CPT | Performed by: ADVANCED PRACTICE MIDWIFE

## 2023-10-25 PROCEDURE — 84443 ASSAY THYROID STIM HORMONE: CPT | Performed by: ADVANCED PRACTICE MIDWIFE

## 2023-10-25 PROCEDURE — 85027 COMPLETE CBC AUTOMATED: CPT | Performed by: ADVANCED PRACTICE MIDWIFE

## 2023-10-25 PROCEDURE — 99214 OFFICE O/P EST MOD 30 MIN: CPT | Mod: 25 | Performed by: ADVANCED PRACTICE MIDWIFE

## 2023-10-25 PROCEDURE — 80053 COMPREHEN METABOLIC PANEL: CPT | Performed by: ADVANCED PRACTICE MIDWIFE

## 2023-10-25 PROCEDURE — 83550 IRON BINDING TEST: CPT | Performed by: ADVANCED PRACTICE MIDWIFE

## 2023-10-25 PROCEDURE — 86780 TREPONEMA PALLIDUM: CPT | Performed by: ADVANCED PRACTICE MIDWIFE

## 2023-10-25 PROCEDURE — 87491 CHLMYD TRACH DNA AMP PROBE: CPT | Performed by: ADVANCED PRACTICE MIDWIFE

## 2023-10-25 RX ORDER — NIRMATRELVIR AND RITONAVIR 300-100 MG
KIT ORAL
COMMUNITY
Start: 2023-09-26 | End: 2024-01-02

## 2023-10-25 ASSESSMENT — ANXIETY QUESTIONNAIRES
7. FEELING AFRAID AS IF SOMETHING AWFUL MIGHT HAPPEN: SEVERAL DAYS
2. NOT BEING ABLE TO STOP OR CONTROL WORRYING: SEVERAL DAYS
5. BEING SO RESTLESS THAT IT IS HARD TO SIT STILL: NOT AT ALL
GAD7 TOTAL SCORE: 8
IF YOU CHECKED OFF ANY PROBLEMS ON THIS QUESTIONNAIRE, HOW DIFFICULT HAVE THESE PROBLEMS MADE IT FOR YOU TO DO YOUR WORK, TAKE CARE OF THINGS AT HOME, OR GET ALONG WITH OTHER PEOPLE: SOMEWHAT DIFFICULT
3. WORRYING TOO MUCH ABOUT DIFFERENT THINGS: SEVERAL DAYS
1. FEELING NERVOUS, ANXIOUS, OR ON EDGE: MORE THAN HALF THE DAYS
GAD7 TOTAL SCORE: 8
6. BECOMING EASILY ANNOYED OR IRRITABLE: MORE THAN HALF THE DAYS

## 2023-10-25 ASSESSMENT — PATIENT HEALTH QUESTIONNAIRE - PHQ9
SUM OF ALL RESPONSES TO PHQ QUESTIONS 1-9: 6
5. POOR APPETITE OR OVEREATING: SEVERAL DAYS

## 2023-10-26 DIAGNOSIS — N76.0 BV (BACTERIAL VAGINOSIS): Primary | ICD-10-CM

## 2023-10-26 DIAGNOSIS — A59.01 TRICHOMONAS VAGINITIS: ICD-10-CM

## 2023-10-26 DIAGNOSIS — B96.89 BV (BACTERIAL VAGINOSIS): Primary | ICD-10-CM

## 2023-10-26 PROBLEM — F10.11 HISTORY OF ALCOHOL ABUSE: Status: ACTIVE | Noted: 2023-10-26

## 2023-10-26 PROBLEM — Z72.0 TOBACCO USE: Status: ACTIVE | Noted: 2023-10-26

## 2023-10-26 LAB
ALBUMIN SERPL BCG-MCNC: 4.1 G/DL (ref 3.5–5.2)
ALP SERPL-CCNC: 73 U/L (ref 35–104)
ALT SERPL W P-5'-P-CCNC: 16 U/L (ref 0–50)
ANION GAP SERPL CALCULATED.3IONS-SCNC: 13 MMOL/L (ref 7–15)
AST SERPL W P-5'-P-CCNC: 21 U/L (ref 0–45)
BACTERIAL VAGINOSIS VAG-IMP: POSITIVE
BILIRUB SERPL-MCNC: 0.2 MG/DL
BUN SERPL-MCNC: 9.6 MG/DL (ref 6–20)
C TRACH DNA SPEC QL NAA+PROBE: NEGATIVE
CALCIUM SERPL-MCNC: 9.8 MG/DL (ref 8.6–10)
CANDIDA DNA VAG QL NAA+PROBE: NOT DETECTED
CANDIDA GLABRATA / CANDIDA KRUSEI DNA: NOT DETECTED
CHLORIDE SERPL-SCNC: 105 MMOL/L (ref 98–107)
CREAT SERPL-MCNC: 0.85 MG/DL (ref 0.51–0.95)
DEPRECATED HCO3 PLAS-SCNC: 20 MMOL/L (ref 22–29)
EGFRCR SERPLBLD CKD-EPI 2021: 86 ML/MIN/1.73M2
FERRITIN SERPL-MCNC: 38 NG/ML (ref 6–175)
GLUCOSE SERPL-MCNC: 87 MG/DL (ref 70–99)
HCV AB SERPL QL IA: NONREACTIVE
HIV 1+2 AB+HIV1 P24 AG SERPL QL IA: NONREACTIVE
IRON BINDING CAPACITY (ROCHE): 312 UG/DL (ref 240–430)
IRON SATN MFR SERPL: 9 % (ref 15–46)
IRON SERPL-MCNC: 29 UG/DL (ref 37–145)
N GONORRHOEA DNA SPEC QL NAA+PROBE: NEGATIVE
POTASSIUM SERPL-SCNC: 4.2 MMOL/L (ref 3.4–5.3)
PROT SERPL-MCNC: 7.5 G/DL (ref 6.4–8.3)
SODIUM SERPL-SCNC: 138 MMOL/L (ref 135–145)
T PALLIDUM AB SER QL: NONREACTIVE
T VAGINALIS DNA VAG QL NAA+PROBE: DETECTED
TSH SERPL DL<=0.005 MIU/L-ACNC: 1.88 UIU/ML (ref 0.3–4.2)

## 2023-10-26 RX ORDER — METRONIDAZOLE 500 MG/1
500 TABLET ORAL 2 TIMES DAILY
Qty: 14 TABLET | Refills: 0 | Status: SHIPPED | OUTPATIENT
Start: 2023-10-26 | End: 2023-11-02

## 2023-10-26 RX ORDER — FLUCONAZOLE 150 MG/1
150 TABLET ORAL ONCE
Qty: 1 TABLET | Refills: 0 | Status: SHIPPED | OUTPATIENT
Start: 2023-10-26 | End: 2023-10-26

## 2023-10-30 LAB
BKR LAB AP GYN ADEQUACY: NORMAL
BKR LAB AP GYN INTERPRETATION: NORMAL
BKR LAB AP HPV REFLEX: NORMAL
BKR LAB AP LMP: NORMAL
BKR LAB AP PREVIOUS ABNORMAL: NORMAL
PATH REPORT.COMMENTS IMP SPEC: NORMAL
PATH REPORT.COMMENTS IMP SPEC: NORMAL
PATH REPORT.RELEVANT HX SPEC: NORMAL

## 2023-11-01 ENCOUNTER — PATIENT OUTREACH (OUTPATIENT)
Dept: OBGYN | Facility: CLINIC | Age: 43
End: 2023-11-01
Payer: COMMERCIAL

## 2023-11-01 LAB
HUMAN PAPILLOMA VIRUS 16 DNA: NEGATIVE
HUMAN PAPILLOMA VIRUS 18 DNA: NEGATIVE
HUMAN PAPILLOMA VIRUS FINAL DIAGNOSIS: NORMAL
HUMAN PAPILLOMA VIRUS OTHER HR: NEGATIVE

## 2023-11-09 ENCOUNTER — OFFICE VISIT (OUTPATIENT)
Dept: FAMILY MEDICINE | Facility: CLINIC | Age: 43
End: 2023-11-09
Payer: COMMERCIAL

## 2023-11-09 VITALS
OXYGEN SATURATION: 98 % | WEIGHT: 165 LBS | RESPIRATION RATE: 20 BRPM | DIASTOLIC BLOOD PRESSURE: 60 MMHG | BODY MASS INDEX: 26.52 KG/M2 | TEMPERATURE: 98.3 F | SYSTOLIC BLOOD PRESSURE: 108 MMHG | HEART RATE: 80 BPM | HEIGHT: 66 IN

## 2023-11-09 DIAGNOSIS — Z00.00 ROUTINE GENERAL MEDICAL EXAMINATION AT A HEALTH CARE FACILITY: Primary | ICD-10-CM

## 2023-11-09 DIAGNOSIS — Z23 NEED FOR VACCINATION: ICD-10-CM

## 2023-11-09 DIAGNOSIS — Z13.220 LIPID SCREENING: ICD-10-CM

## 2023-11-09 DIAGNOSIS — R93.89 ABNORMAL CT OF THE CHEST: ICD-10-CM

## 2023-11-09 DIAGNOSIS — D86.9 SARCOIDOSIS: ICD-10-CM

## 2023-11-09 DIAGNOSIS — Z13.9 ENCOUNTER FOR SCREENING INVOLVING SOCIAL DETERMINANTS OF HEALTH (SDOH): ICD-10-CM

## 2023-11-09 PROCEDURE — 90471 IMMUNIZATION ADMIN: CPT | Performed by: FAMILY MEDICINE

## 2023-11-09 PROCEDURE — 99386 PREV VISIT NEW AGE 40-64: CPT | Mod: 25 | Performed by: FAMILY MEDICINE

## 2023-11-09 PROCEDURE — 90677 PCV20 VACCINE IM: CPT | Performed by: FAMILY MEDICINE

## 2023-11-09 PROCEDURE — 90472 IMMUNIZATION ADMIN EACH ADD: CPT | Performed by: FAMILY MEDICINE

## 2023-11-09 PROCEDURE — 80061 LIPID PANEL: CPT | Performed by: FAMILY MEDICINE

## 2023-11-09 PROCEDURE — 36415 COLL VENOUS BLD VENIPUNCTURE: CPT | Performed by: FAMILY MEDICINE

## 2023-11-09 PROCEDURE — 90686 IIV4 VACC NO PRSV 0.5 ML IM: CPT | Performed by: FAMILY MEDICINE

## 2023-11-09 RX ORDER — FLUCONAZOLE 150 MG/1
TABLET ORAL
COMMUNITY
Start: 2023-10-29 | End: 2024-01-02

## 2023-11-09 RX ORDER — ALBUTEROL SULFATE 90 UG/1
2 AEROSOL, METERED RESPIRATORY (INHALATION) EVERY 6 HOURS PRN
Qty: 18 G | Refills: 3 | Status: SHIPPED | OUTPATIENT
Start: 2023-11-09

## 2023-11-09 ASSESSMENT — ANXIETY QUESTIONNAIRES
3. WORRYING TOO MUCH ABOUT DIFFERENT THINGS: SEVERAL DAYS
6. BECOMING EASILY ANNOYED OR IRRITABLE: SEVERAL DAYS
GAD7 TOTAL SCORE: 6
2. NOT BEING ABLE TO STOP OR CONTROL WORRYING: SEVERAL DAYS
7. FEELING AFRAID AS IF SOMETHING AWFUL MIGHT HAPPEN: SEVERAL DAYS
GAD7 TOTAL SCORE: 6
5. BEING SO RESTLESS THAT IT IS HARD TO SIT STILL: NOT AT ALL
1. FEELING NERVOUS, ANXIOUS, OR ON EDGE: SEVERAL DAYS
IF YOU CHECKED OFF ANY PROBLEMS ON THIS QUESTIONNAIRE, HOW DIFFICULT HAVE THESE PROBLEMS MADE IT FOR YOU TO DO YOUR WORK, TAKE CARE OF THINGS AT HOME, OR GET ALONG WITH OTHER PEOPLE: SOMEWHAT DIFFICULT

## 2023-11-09 ASSESSMENT — ENCOUNTER SYMPTOMS
BREAST MASS: 0
HEMATOCHEZIA: 0
PALPITATIONS: 0
NERVOUS/ANXIOUS: 1
DYSURIA: 0
MYALGIAS: 0
HEARTBURN: 0
DIZZINESS: 0
ABDOMINAL PAIN: 0
FREQUENCY: 1
CONSTIPATION: 1
FEVER: 0
WEAKNESS: 0
DIARRHEA: 0
HEADACHES: 0
ARTHRALGIAS: 0
NAUSEA: 0
SHORTNESS OF BREATH: 0
JOINT SWELLING: 0
EYE PAIN: 1
SORE THROAT: 0
PARESTHESIAS: 0
HEMATURIA: 0
CHILLS: 0
COUGH: 1

## 2023-11-09 ASSESSMENT — PAIN SCALES - GENERAL: PAINLEVEL: MODERATE PAIN (4)

## 2023-11-09 ASSESSMENT — PATIENT HEALTH QUESTIONNAIRE - PHQ9: 5. POOR APPETITE OR OVEREATING: SEVERAL DAYS

## 2023-11-09 NOTE — COMMUNITY RESOURCES LIST (ENGLISH)
11/09/2023   [facility]  N/A  For questions about this resource list or additional care needs, please contact your primary care clinic or care manager.  Phone: [phone]   Email: N/A   Address: [address] [place] [address]   Hours: N/A        Financial Stability       Rent and mortgage payment assistance  1  [facility] Distance: 3.18 miles      In-Person, Phone/Virtual   [address] [place] [address]  Language: English, Singaporean  Hours: Mon - Fri 9:00 AM - 4:30 PM  Fees: Free   Phone: (792) 574-3826 Ext.113 Email: info@[facility].[employer] Website: https://[employer]     2  [facility] - Rent and mortgage payment assistance Distance: 4.57 miles      In-Person, Phone/Virtual   [address] [place] [address]  Language: English  Hours: Mon - Thu 9:00 AM - 3:00 PM  Fees: Free   Phone: (898) 257-7244 Email: Jain@[facility].org Website: http://www.[facility].org/care-ministries/          Food and Nutrition       Food pantry  3  [employer] ([facility]) Distance: 0.4 miles      In-Person   [address]\"" [address] [place] [address]  Language: English  Hours: Mon - Sun Open 24 Hours  Fees: Free   Phone: (598) 460-9376 Email: [email or web address] Website: http://www.[facility].org     4  [facility] - Fare for All Distance: 3.05 miles      Pickup   [address] [place] [address]  Language: English, Singaporean  Hours: Fri 10:00 AM - 1:00 PM  Fees: Self Pay   Phone: (519) 702-4599 Email: narinder@[facility].gov Website: https://www.[facility].gov/[facility]/dfkdiiuuu-zfouozqzv-wkgyry     SNAP application assistance  5  [facility] Distance: 3.18 miles      In-Person, Phone/Virtual   [address] [place] [address]  Language: English, Singaporean  Hours: Mon - Fri 9:00 AM - 4:30 PM  Fees: Free   Phone: (511) 764-7517 Ext.113  Email: info@[employer].org Website: https://veap.org     6  [employer] Distance: 6.38 miles      In-Person   [address] [place] [address]  Language: English, Bengali  Hours: Mon - Fri 8:30 AM - 5:00 PM  Fees: Free   Phone: (369) 996-2762 Email: info@[employer].org Website: http://www.[employer].org/     Soup kitchen or free meals  7  [employer] Distance: 0.98 miles      Pickup   [address] [place] [address]  Language: English  Hours: Sat 5:00 PM - 7:00 PM , Sun 5:30 PM - 6:30 PM  Fees: Free   Phone: (273) 310-7043 Email: office@[employer] Website: http://[employer]/     8  [employer] - Loaves and Fishes Distance: 2.02 miles      Pickup   [address] [place] [address]  Language: English  Hours: Mon - Fri 5:00 PM - 6:00 PM  Fees: Free   Phone: (817) 648-3200 Email: contactus@[employer].org Website: https://www.[facility].org/          Important Numbers & Websites       Emergency Services   911  [facility] Services   311  Poison Control   (181) 554-6290  Suicide Prevention Lifeline   (979) 252-7012 (TALK)  Child Abuse Hotline   (192) 857-2591 (4-A-Child)  Sexual Assault Hotline   (157) 482-9144 (HOPE)  National Runaway Safeline   (849) 709-7626 (RUNAWAY)  All-Options Talkline   (276) 153-8331  Substance Abuse Referral   (445) 321-9178 (HELP)

## 2023-11-09 NOTE — COMMUNITY RESOURCES LIST (ENGLISH)
11/09/2023   [facility]  N/A  For questions about this resource list or additional care needs, please contact your primary care clinic or care manager.  Phone: [phone]   Email: N/A   Address: [address] [place] [address]   Hours: N/A        Financial Stability       Rent and mortgage payment assistance  1  [facility] Distance: 3.18 miles      In-Person, Phone/Virtual   [address] [place] [address]  Language: English, Malaysian  Hours: Mon - Fri 9:00 AM - 4:30 PM  Fees: Free   Phone: (293) 265-1697 Ext.113 Email: info@[facility].[employer] Website: https://[employer] Elements     2  [facility] - Rent and mortgage payment assistance Distance: 4.57 miles      In-Person, Phone/Virtual   [address] [place] [address]  Language: English  Hours: Mon - Thu 9:00 AM - 3:00 PM  Fees: Free   Phone: (140) 401-7132 Email: Jain@[facility].org Website: http://www.[facility].org/care-ministries/          Food and Nutrition       Food pantry  3  [employer]. ([facility]) Distance: 0.4 miles      In-Person   [address] [facility] [facility] [address] [place] [address]  Language: English  Hours: Mon - Sun Open 24 Hours  Fees: Free   Phone: (344) 595-5013 Email: [email or web address] Website: http://www.[facility].org     4  [facility] - Fare for All Distance: 3.05 miles      Pickup   [address] [place] [address]  Language: English, Malaysian  Hours: Fri 10:00 AM - 1:00 PM  Fees: Self Pay   Phone: (397) 581-9738 Email: narinder@[facility].gov Website: https://www.[facility].gov/[facility]/wegqbmfgs-wdtkzhzkm-hidmsl     SNAP application assistance  5  [facility] Distance: 3.18 miles      In-Person, Phone/Virtual   [address] [place] [address]  Language: English, Malaysian  Hours: Mon - Fri 9:00 AM - 4:30 PM  Fees: Free   Phone: (835) 642-2206 Ext.113  Email: info@[employer].org Website: https://veap.org     6  [employer] (CLUES) [facility] Distance: 6.38 miles      In-Person   [address] [place] [address]  Language: English, Belarusian  Hours: Mon - Fri 8:30 AM - 5:00 PM  Fees: Free   Phone: (549) 369-6160 Email: info@[employer].org Website: http://www.[employer].org/     Soup kitchen or free meals  7  [employer] - [employer]aves [employer]ishes Distance: 0.98 miles      Pickup   [address] [place] [address]  Language: English  Hours: Sat 5:00 PM - 7:00 PM , Sun 5:30 PM - 6:30 PM  Fees: Free   Phone: (751) 891-4797 Email: office@[employer] Website: http://[employer]/     8  [employer] - [employer] Distance: 2.02 miles      Pickup   [address] [place] [address]  Language: English  Hours: Mon - Fri 5:00 PM - 6:00 PM  Fees: Free   Phone: (759) 663-8951 Email: contactus@[employer].org Website: https://www.[employer].org/          Important Numbers & Websites       Emergency Services   911  [facility] Services   311  Poison Control   (631) 281-7455  Suicide Prevention Lifeline   (355) 188-7861 (TALK)  Child Abuse Hotline   (467) 384-6923 (4-A-Child)  Sexual Assault Hotline   (145) 338-8618 (HOPE)  National Runaway Safeline   (222) 320-1339 (RUNAWAY)  All-Options Talkline   (868) 963-1642  Substance Abuse Referral   (768) 370-6055 (HELP)

## 2023-11-09 NOTE — PROGRESS NOTES
SUBJECTIVE:   CC: Tasneem is an 43 year old who presents for preventive health visit.       11/9/2023     2:34 PM   Additional Questions   Roomed by Libra BALDERAS       Healthy Habits:     Getting at least 3 servings of Calcium per day:  NO    Bi-annual eye exam:  Yes    Dental care twice a year:  NO    Sleep apnea or symptoms of sleep apnea:  Excessive snoring    Diet:  Regular (no restrictions)    Frequency of exercise:  1 day/week    Duration of exercise:  15-30 minutes    Taking medications regularly:  No    Barriers to taking medications:  None    Medication side effects:  Other    Additional concerns today:  No      Sober house   Outpatient treatment everyday   Alcohol and marijuana was drug of choice     Started outpatient September 15th August 18th inpatient in [place]     Today's PHQ-2 Score:       11/9/2023     2:24 PM   PHQ-2 ( 1999 [employer])   Q1: Little interest or pleasure in doing things 0   Q2: Feeling down, depressed or hopeless 0   PHQ-2 Score 0   Q1: Little interest or pleasure in doing things Not at all   Q2: Feeling down, depressed or hopeless Not at all   PHQ-2 Score 0       Social History     Tobacco Use    Smoking status: Every Day     Packs/day: 1.25     Years: 25.00     Additional pack years: 0.00     Total pack years: 31.25     Types: Cigarettes    Smokeless tobacco: Never   Substance Use Topics    Alcohol use: Not Currently     Comment: hx alcohol abuse, sober x2 months as of 10/2023             11/9/2023     2:24 PM   Alcohol Use   Prescreen: >3 drinks/day or >7 drinks/week? No          No data to display              Reviewed orders with patient.  Reviewed health maintenance and updated orders accordingly - Yes    Breast Cancer Screening:    FHS-7:        No data to display              click delete button to remove this line now  Pertinent mammograms are reviewed under the imaging tab.    History of abnormal Pap smear: YES - updated in Problem List and Health Maintenance  "accordingly      Latest Ref Rng & Units 10/25/2023     3:00 PM 7/24/2012    11:25 AM 7/7/2011    12:00 AM   PAP / HPV   PAP  Negative for Intraepithelial Lesion or Malignancy (NILM)      PAP (Historical)   NIL  NIL    HPV 16 DNA Negative Negative      HPV 18 DNA Negative Negative      Other HR HPV Negative Negative        Reviewed and updated as needed this visit by clinical staff   Tobacco  Allergies  Meds              Reviewed and updated as needed this visit by Provider                   Review of Systems   Constitutional:  Negative for chills and fever.   HENT:  Positive for congestion. Negative for ear pain, hearing loss and sore throat.    Eyes:  Positive for pain and visual disturbance.   Respiratory:  Positive for cough. Negative for shortness of breath.    Cardiovascular:  Negative for chest pain, palpitations and peripheral edema.   Gastrointestinal:  Positive for constipation. Negative for abdominal pain, diarrhea, heartburn, hematochezia and nausea.   Breasts:  Negative for tenderness, breast mass and discharge.   Genitourinary:  Positive for frequency and pelvic pain. Negative for dysuria, genital sores, hematuria, urgency, vaginal bleeding and vaginal discharge.   Musculoskeletal:  Negative for arthralgias, joint swelling and myalgias.   Skin:  Negative for rash.   Neurological:  Negative for dizziness, weakness, headaches and paresthesias.   Psychiatric/Behavioral:  Negative for mood changes. The patient is nervous/anxious.         OBJECTIVE:   /60 (BP Location: Right arm, Patient Position: Sitting, Cuff Size: Adult Regular)   Pulse 80   Temp 98.3  F (36.8  C) (Temporal)   Resp 20   Ht 1.67 m (5' 5.75\")   Wt 74.8 kg (165 lb)   LMP 10/14/2023 (Exact Date)   SpO2 98%   BMI 26.84 kg/m    Physical Exam  Constitutional:       General: She is not in acute distress.  HENT:      Head: Normocephalic.      Right Ear: Tympanic membrane normal.      Left Ear: Tympanic membrane normal.      " Mouth/Throat:      Mouth: Mucous membranes are moist.      Pharynx: Oropharynx is clear.   Eyes:      General: No scleral icterus.  Cardiovascular:      Rate and Rhythm: Normal rate and regular rhythm.      Heart sounds: Normal heart sounds.   Pulmonary:      Effort: No respiratory distress.      Breath sounds: Normal breath sounds.   Abdominal:      General: Abdomen is flat. Bowel sounds are normal.      Palpations: Abdomen is soft.   Musculoskeletal:      Cervical back: No tenderness.      Right lower leg: No edema.      Left lower leg: No edema.   Lymphadenopathy:      Cervical: No cervical adenopathy.   Skin:     General: Skin is warm.   Neurological:      General: No focal deficit present.      Mental Status: She is alert.   Psychiatric:         Mood and Affect: Mood normal.         Behavior: Behavior normal.           ASSESSMENT/PLAN:       ICD-10-CM    1. Routine general medical examination at a health care facility  Z00.00 REVIEW OF HEALTH MAINTENANCE PROTOCOL ORDERS      2. Encounter for screening involving social determinants of health (SDoH)  Z13.9       3. Sarcoidosis  D86.9 albuterol (PROAIR HFA/PROVENTIL HFA/VENTOLIN HFA) 108 (90 Base) MCG/ACT inhaler     Adult Pulmonary Medicine  Referral     XR Chest 2 Views     Adult Pulmonary Medicine  Referral      4. Lipid screening  Z13.220 Lipid panel reflex to direct LDL Fasting     Lipid panel reflex to direct LDL Fasting      5. Need for vaccination  Z23 Pneumococcal 20 Valent Conjugate (Prevnar 20)     INFLUENZA VACCINE IM > 6 MONTHS VALENT IIV4 (AFLURIA/FLUZONE)      6. Abnormal CT of the chest  R93.89 XR Chest 2 Views     Adult Pulmonary Medicine  Referral          Patient has been advised of split billing requirements and indicates understanding: No      COUNSELING:  Reviewed preventive health counseling, as reflected in patient instructions      BMI:   Estimated body mass index is 26.84 kg/m  as calculated from the  "following:    Height as of this encounter: 1.67 m (5' 5.75\").    Weight as of this encounter: 74.8 kg (165 lb).   Weight management plan: Patient referred to endocrine and/or weight management specialty      She reports that she has been smoking cigarettes. She has a 31.25 pack-year smoking history. She has never used smokeless tobacco.  Nicotine/Tobacco Cessation Plan:   Information offered: Patient not interested at this time        Adan Meneses [facility]  "

## 2023-11-10 LAB
CHOLEST SERPL-MCNC: 188 MG/DL
HDLC SERPL-MCNC: 53 MG/DL
LDLC SERPL CALC-MCNC: 117 MG/DL
NONHDLC SERPL-MCNC: 135 MG/DL
TRIGL SERPL-MCNC: 89 MG/DL

## 2023-11-14 ENCOUNTER — TELEPHONE (OUTPATIENT)
Dept: PULMONOLOGY | Facility: CLINIC | Age: 43
End: 2023-11-14
Payer: COMMERCIAL

## 2023-11-14 NOTE — CONFIDENTIAL NOTE
ILD New Patient Referral: Pre visit communication    Patient: Tasneem Hogan  Reason for Referral: Sarcoid  Referring Physician:   Adan Meneses DO   [address] [facility] [address]   Phone: [phone]       Chest CT scan: 2012 eal   Biopsy: 2012 eal  PFT's:2012 [facility]  Additional testing:     Current symptoms: SOB, cough, smoker  Current related prescriptions: No    Supplemental oxygen? No    In review of apparent records and conversation with patient; recommend first visit with ILD provider be conducted :  In person visit  Testing needed: CT scan and Full PFT's and walk    Additional notes:                                                                  Most viral illnesses get worse for the first 3-5 days, then plateau and improve gradually over the next 3-5 days. Monitor symptoms for now.    Use otc meds for comfort as needed--  flonase to reduce congestion and to reduce ear symptoms  Saline nasal spra

## 2023-11-16 ENCOUNTER — MYC MEDICAL ADVICE (OUTPATIENT)
Dept: FAMILY MEDICINE | Facility: CLINIC | Age: 43
End: 2023-11-16

## 2023-11-16 ENCOUNTER — APPOINTMENT (OUTPATIENT)
Dept: FAMILY MEDICINE | Facility: CLINIC | Age: 43
End: 2023-11-16
Payer: COMMERCIAL

## 2023-11-23 NOTE — PROGRESS NOTES
"  SUBJECTIVE:                                                   Tasneem Hogan is a 43 year old who presents to clinic today for the following health issue(s):  Patient presents with:  Follow Up      HPI: US follow-up. Saw me on 10/25 for annual exam and evaluation of heavy menses (see encounter note for details). Recommended pelvic US for further evaluation of heavy menses.     Prelim US report: \"retroverted. Uterine fibroids. Right ovarian cyst 4.0 x 3.5 x 3.7cm\"    Has no questions or concerns for me otherwise today.     Did mammogram today as well    Patient's last menstrual period was 2023 (exact date).  Menstrual History: regular, heavy  Patient is not sexually active  .  Using none for contraception.   Health maintenance updated:  no  STI infx testing offered:  Declined    Last PHQ-9 score on record =       10/25/2023     2:37 PM   PHQ-9 SCORE   PHQ-9 Total Score 6     Last GAD7 score on record =       2023     2:41 PM   GIL-7 SCORE   Total Score 6     Alcohol Score = 0    Problem list and histories reviewed & adjusted, as indicated.  Additional history: as documented.    Patient Active Problem List   Diagnosis    Wheeze    HSIL (high grade squamous intraepithelial lesion) on Pap smear    CARDIOVASCULAR SCREENING; LDL GOAL LESS THAN 160    Sarcoidosis    S/P LEEP of cervix    Iron deficiency anemia, unspecified iron deficiency anemia type    History of alcohol abuse    Tobacco use     Past Surgical History:   Procedure Laterality Date    LEEP [place], CERVICAL  2009    harriet II & III    [facility] NONSPECIFIC PROCEDURE      - 2 children Male 12-00, Female '93      Social History     Tobacco Use    Smoking status: Every Day     Packs/day: 1.25     Years: 25.00     Additional pack years: 0.00     Total pack years: 31.25     Types: Cigarettes    Smokeless tobacco: Never   Substance Use Topics    Alcohol use: Not Currently     Comment: hx alcohol abuse, sober x2 months as of 10/2023      Problem " (# of Occurrences) Relation (Name,Age of Onset)    Heart Disease (2) Father, Daughter    Cerebrovascular Disease (1) Father    Other - See Comments (1) Maternal Aunt: cancer    No Known Problems (1) Son              Current Outpatient Medications   Medication Sig    ferrous sulfate (FEROSUL) 325 (65 Fe) MG tablet Take 1 tablet (325 mg) by mouth daily (with breakfast)    naltrexone (DEPADE/REVIA) 50 MG tablet Take 50 mg by mouth At Bedtime    albuterol (PROAIR HFA) 108 (90 BASE) MCG/ACT inhaler Inhale 2 puffs into the lungs every 4 hours as needed for shortness of breath / dyspnea. EVERY 4 TO 6 HOURS AS NEEDED (Patient not taking: Reported on 10/25/2023)    albuterol (PROAIR HFA/PROVENTIL HFA/VENTOLIN HFA) 108 (90 Base) MCG/ACT inhaler Inhale 2 puffs into the lungs every 6 hours as needed for shortness of breath, wheezing or cough (Patient not taking: Reported on 11/27/2023)    fluconazole (DIFLUCAN) 150 MG tablet  (Patient not taking: Reported on 11/27/2023)    Mometasone Furo-Formoterol Fum (DULERA IN) Inhale 2 puffs into the lungs 2 times daily. (Patient not taking: Reported on 10/25/2023)    PAXLOVID, 300/100, 20 x 150 MG & 10 x 100MG therapy pack TAKE 2 TABLETS (NIRMATRELVIR) AND TAKE 1 TABLET (RITONAVIR) BY MOUTH TWICE A DAY FOR 5 DAYS (Patient not taking: Reported on 10/25/2023)     No current facility-administered medications for this visit.     Allergies   Allergen Reactions    Nkda [No Known Drug Allergy]     Prozac [Fluoxetine] Other (See Comments)     Dissociation        ROS:  12 point review of systems negative other than symptoms noted below or in the HPI.    OBJECTIVE:     /80   Wt 74.3 kg (163 lb 12.8 oz)   LMP 11/07/2023 (Exact Date)   BMI 26.64 kg/m    Body mass index is 26.64 kg/m .    PHYSICAL EXAM:  Constitutional:  Appearance: Well nourished, well developed alert, in no acute distress  Chest:  Respiratory Effort:  Breathing unlabored  Psychiatric:  Mentation appears normal and affect  "normal/bright.     In-Clinic Test Results:  No results found for this or any previous visit (from the past 24 hour(s)).    ASSESSMENT/PLAN:                                                        ICD-10-CM    1. Menorrhagia with regular cycle  N92.0       2. Uterine leiomyoma, unspecified location  D25.9       3. Right ovarian cyst  N83.201           COUNSELING:  -Discussed preliminary US report notes \"uterine fibroids,\" uncertain of number of fibroids, size and location. We discussed these fibroid could very well be contributing to heavy menses. Tasneem is unsure if she desires any management at this point, knows she does not want any form of hormonal birth control. Would like to wait for final US report and then will possibly schedule consult appt with MD afterwards  -Discussed right simple cyst, reviewed normal progression of ovarian cysts, likely to resolve on its own but given size may want to re-image in 2-3 months. Warning signs reviewed.   -RTC as needed for consult/management of fibroids/heavy menses    FRANCA Romano, YULISSA        "

## 2023-11-27 ENCOUNTER — OFFICE VISIT (OUTPATIENT)
Dept: MIDWIFE SERVICES | Facility: CLINIC | Age: 43
End: 2023-11-27
Attending: ADVANCED PRACTICE MIDWIFE
Payer: COMMERCIAL

## 2023-11-27 ENCOUNTER — ANCILLARY PROCEDURE (OUTPATIENT)
Dept: MAMMOGRAPHY | Facility: CLINIC | Age: 43
End: 2023-11-27
Attending: ADVANCED PRACTICE MIDWIFE
Payer: COMMERCIAL

## 2023-11-27 ENCOUNTER — ANCILLARY PROCEDURE (OUTPATIENT)
Dept: ULTRASOUND IMAGING | Facility: CLINIC | Age: 43
End: 2023-11-27
Attending: ADVANCED PRACTICE MIDWIFE
Payer: COMMERCIAL

## 2023-11-27 VITALS — SYSTOLIC BLOOD PRESSURE: 100 MMHG | DIASTOLIC BLOOD PRESSURE: 80 MMHG | WEIGHT: 163.8 LBS | BODY MASS INDEX: 26.64 KG/M2

## 2023-11-27 DIAGNOSIS — N92.0 MENORRHAGIA WITH REGULAR CYCLE: ICD-10-CM

## 2023-11-27 DIAGNOSIS — N83.201 RIGHT OVARIAN CYST: ICD-10-CM

## 2023-11-27 DIAGNOSIS — Z12.31 ENCOUNTER FOR SCREENING MAMMOGRAM FOR MALIGNANT NEOPLASM OF BREAST: ICD-10-CM

## 2023-11-27 DIAGNOSIS — N92.0 MENORRHAGIA WITH REGULAR CYCLE: Primary | ICD-10-CM

## 2023-11-27 DIAGNOSIS — D25.9 UTERINE LEIOMYOMA, UNSPECIFIED LOCATION: ICD-10-CM

## 2023-11-27 PROCEDURE — 77067 SCR MAMMO BI INCL CAD: CPT | Mod: TC | Performed by: RADIOLOGY

## 2023-11-27 PROCEDURE — 99213 OFFICE O/P EST LOW 20 MIN: CPT | Performed by: ADVANCED PRACTICE MIDWIFE

## 2023-11-27 PROCEDURE — 76830 TRANSVAGINAL US NON-OB: CPT | Performed by: OBSTETRICS & GYNECOLOGY

## 2023-11-28 ENCOUNTER — TELEPHONE (OUTPATIENT)
Dept: PULMONOLOGY | Facility: CLINIC | Age: 43
End: 2023-11-28
Payer: COMMERCIAL

## 2023-11-28 NOTE — TELEPHONE ENCOUNTER
Patient called several times for an appointment, has not returned any of our calls, direct clinic number left for pt to call back.

## 2023-11-29 NOTE — RESULT ENCOUNTER NOTE
Seen in clinic yesterday 11/27, final report sent via [facility], previously discussed management options. Patient to call for further interventions possible MD consult.    FRANCA Freeman, CNM

## 2023-12-04 ENCOUNTER — HOSPITAL ENCOUNTER (OUTPATIENT)
Dept: MAMMOGRAPHY | Facility: CLINIC | Age: 43
Discharge: HOME | End: 2023-12-04
Attending: ADVANCED PRACTICE MIDWIFE
Payer: COMMERCIAL

## 2023-12-04 DIAGNOSIS — R92.8 ABNORMAL MAMMOGRAM: ICD-10-CM

## 2023-12-04 PROCEDURE — 76642 ULTRASOUND BREAST LIMITED: CPT | Mod: RT

## 2023-12-04 PROCEDURE — 77061 BREAST TOMOSYNTHESIS UNI: CPT | Mod: RT

## 2023-12-11 DIAGNOSIS — J84.9 ILD (INTERSTITIAL LUNG DISEASE) (H): Primary | ICD-10-CM

## 2023-12-18 ENCOUNTER — ANCILLARY PROCEDURE (OUTPATIENT)
Dept: CT IMAGING | Facility: CLINIC | Age: 43
End: 2023-12-18
Attending: INTERNAL MEDICINE
Payer: COMMERCIAL

## 2023-12-18 ENCOUNTER — OFFICE VISIT (OUTPATIENT)
Dept: PULMONOLOGY | Facility: CLINIC | Age: 43
End: 2023-12-18
Payer: COMMERCIAL

## 2023-12-18 DIAGNOSIS — J84.9 ILD (INTERSTITIAL LUNG DISEASE) (H): ICD-10-CM

## 2023-12-18 LAB
6 MIN WALK (FT): 1540 FT
6 MIN WALK (M): 469 M

## 2023-12-18 PROCEDURE — 94618 PULMONARY STRESS TESTING: CPT | Performed by: INTERNAL MEDICINE

## 2023-12-18 PROCEDURE — 94729 DIFFUSING CAPACITY: CPT | Performed by: INTERNAL MEDICINE

## 2023-12-18 PROCEDURE — 94726 PLETHYSMOGRAPHY LUNG VOLUMES: CPT | Performed by: INTERNAL MEDICINE

## 2023-12-18 PROCEDURE — 71250 CT THORAX DX C-: CPT | Performed by: RADIOLOGY

## 2023-12-18 PROCEDURE — 94375 RESPIRATORY FLOW VOLUME LOOP: CPT | Performed by: INTERNAL MEDICINE

## 2023-12-19 LAB
DLCOUNC-%PRED-PRE: 92 %
DLCOUNC-PRE: 20.93 ML/MIN/MMHG
DLCOUNC-PRED: 22.6 ML/MIN/MMHG
ERV-%PRED-PRE: 21 %
ERV-PRE: 0.28 L
ERV-PRED: 1.31 L
EXPTIME-PRE: 9.69 SEC
FEF2575-%PRED-PRE: 38 %
FEF2575-PRE: 1.16 L/SEC
FEF2575-PRED: 3.01 L/SEC
FEFMAX-%PRED-PRE: 67 %
FEFMAX-PRE: 4.95 L/SEC
FEFMAX-PRED: 7.32 L/SEC
FEV1-%PRED-PRE: 69 %
FEV1-PRE: 2.07 L
FEV1FEV6-PRE: 64 %
FEV1FEV6-PRED: 83 %
FEV1FVC-PRE: 64 %
FEV1FVC-PRED: 82 %
FEV1SVC-PRE: 64 %
FEV1SVC-PRED: 79 %
FIFMAX-PRE: 4.3 L/SEC
FRCPLETH-%PRED-PRE: 115 %
FRCPLETH-PRE: 3.29 L
FRCPLETH-PRED: 2.84 L
FVC-%PRED-PRE: 89 %
FVC-PRE: 3.25 L
FVC-PRED: 3.63 L
IC-%PRED-PRE: 112 %
IC-PRE: 2.95 L
IC-PRED: 2.63 L
RVPLETH-%PRED-PRE: 168 %
RVPLETH-PRE: 3 L
RVPLETH-PRED: 1.78 L
TLCPLETH-%PRED-PRE: 115 %
TLCPLETH-PRE: 6.24 L
TLCPLETH-PRED: 5.4 L
VA-%PRED-PRE: 92 %
VA-PRE: 4.94 L
VC-%PRED-PRE: 86 %
VC-PRE: 3.23 L
VC-PRED: 3.75 L

## 2024-01-02 ENCOUNTER — OFFICE VISIT (OUTPATIENT)
Dept: PULMONOLOGY | Facility: CLINIC | Age: 44
End: 2024-01-02
Payer: COMMERCIAL

## 2024-01-02 VITALS
HEIGHT: 66 IN | RESPIRATION RATE: 18 BRPM | BODY MASS INDEX: 26.64 KG/M2 | OXYGEN SATURATION: 98 % | DIASTOLIC BLOOD PRESSURE: 68 MMHG | SYSTOLIC BLOOD PRESSURE: 108 MMHG | HEART RATE: 91 BPM

## 2024-01-02 DIAGNOSIS — D86.9 SARCOIDOSIS: ICD-10-CM

## 2024-01-02 DIAGNOSIS — R93.89 ABNORMAL CT OF THE CHEST: ICD-10-CM

## 2024-01-02 PROCEDURE — 99205 OFFICE O/P NEW HI 60 MIN: CPT | Mod: 25 | Performed by: INTERNAL MEDICINE

## 2024-01-02 ASSESSMENT — PAIN SCALES - GENERAL: PAINLEVEL: NO PAIN (0)

## 2024-01-02 NOTE — PROGRESS NOTES
Reason for Visit  Tasneem Hogan is a 43 year old year old female who is being seen for New Patient (Sarcoidosis +1 more)    ILD HPI    Tasneem Hogan      Current Outpatient Medications   Medication    naltrexone (DEPADE/REVIA) 50 MG tablet    albuterol (PROAIR HFA/PROVENTIL HFA/VENTOLIN HFA) 108 (90 Base) MCG/ACT inhaler    ferrous sulfate (FEROSUL) 325 (65 Fe) MG tablet     No current facility-administered medications for this visit.     Allergies   Allergen Reactions    Nkda [No Known Drug Allergy]     Prozac [Fluoxetine] Other (See Comments)     Dissociation      Past Medical History:   Diagnosis Date    HSIL (high grade squamous intraepithelial lesion) on Pap smear 09/01/2008    leep 11/09 Harriet II & III    Normal delivery 12/19/2000    Osteoporosis     Sarcoidosis 01/01/2010    Sickle-cell trait (H24)     Special screening examination for chlamydial disease     Tobacco abuse 09/24/2008    Unspecified chlamydial infection, in conditions classified elsewhere and of unspecified site 03/01/2001    Unspecified inflammatory disease of female pelvic organs and tissues 03/01/2001       Past Surgical History:   Procedure Laterality Date    LEEP TX, CERVICAL  2009    harriet II & III    [facility] NONSPECIFIC PROCEDURE      - 2 children Male 12-00, Female '93       Social History     Socioeconomic History    Marital status: Legally      Spouse name: FERNANDA HOGAN    Number of children: 2    Years of education: Not on file    Highest education level: Not on file   Occupational History    Occupation:    Tobacco Use    Smoking status: Every Day     Packs/day: 1.25     Years: 25.00     Additional pack years: 0.00     Total pack years: 31.25     Types: Cigarettes    Smokeless tobacco: Never   Vaping Use    Vaping Use: Former   Substance and Sexual Activity    Alcohol use: Not Currently     Comment: hx alcohol abuse, sober x2 months as of 10/2023    Drug use: Not Currently    Sexual activity:  Yes     Partners: Male   Other Topics Concern     Service No    Blood Transfusions No    Caffeine Concern Not Asked    Occupational Exposure Not Asked    Hobby Hazards Not Asked    Sleep Concern Not Asked    Stress Concern Not Asked    Weight Concern Not Asked    Special Diet Not Asked    Back Care Not Asked    Exercise Not Asked    Bike Helmet Not Asked    Seat Belt Not Asked    Self-Exams Not Asked    Parent/sibling w/ CABG, MI or angioplasty before 65F 55M? Yes     Comment: dad with heart attack at unknown age   Social History Narrative    Not on file     Social Determinants of Health     Financial Resource Strain: Low Risk  (11/9/2023)    Financial Resource Strain     Within the past 12 months, have you or your family members you live with been unable to get utilities (heat, electricity) when it was really needed?: No   Food Insecurity: High Risk (11/9/2023)    Food Insecurity     Within the past 12 months, did you worry that your food would run out before you got money to buy more?: Yes     Within the past 12 months, did the food you bought just not last and you didn t have money to get more?: Yes   Transportation Needs: Low Risk  (11/9/2023)    Transportation Needs     Within the past 12 months, has lack of transportation kept you from medical appointments, getting your medicines, non-medical meetings or appointments, work, or from getting things that you need?: No   Physical Activity: Not on file   Stress: Not on file   Social Connections: Not on file   Interpersonal Safety: Low Risk  (11/9/2023)    Interpersonal Safety     Do you feel physically and emotionally safe where you currently live?: Yes     Within the past 12 months, have you been hit, slapped, kicked or otherwise physically hurt by someone?: No     Within the past 12 months, have you been humiliated or emotionally abused in other ways by your partner or ex-partner?: No   Housing Stability: High Risk (11/9/2023)    Housing Stability     Do  "you have housing? : Yes     Are you worried about losing your housing?: Yes       Family History   Problem Relation Age of Onset    Cerebrovascular Disease Father     Heart Disease Father     Heart Disease Daughter     No Known Problems Son     Other - See Comments Maternal Aunt         cancer       ROS Pulmonary    A complete ROS was otherwise negative except as noted in the HPI.  Vitals:    01/02/24 1343   BP: 108/68   BP Location: Left arm   Patient Position: Sitting   Cuff Size: Adult Regular   Pulse: 91   Resp: 18   SpO2: 98%   Height: 1.67 m (5' 5.75\")     Exam:   GENERAL APPEARANCE: Well developed, well nourished, alert, and in no apparent distress.  EYES: PERRL, EOMI  HENT: nasal mucosa with out hyperemia or edema, no nasal polyps.  MOUTH: Oral mucosa is moist, without any lesions, no tonsillar enlargement, no oropharyngeal exudate.  NECK: supple, no masses, no thyromegaly.  LYMPHATICS: No significant axillary, cervical, or supraclavicular nodes.  RESP: good air flow throughout, - no crackles, rhonchi or wheezes.  CV: Normal S1, S2, regular rhythm, normal rate, no rub, no murmur,  no gallop, no LE edema.   ABDOMEN:  Bowel sounds normal, soft, nontender, no HSM or masses.   MS: extremities normal- no clubbing, no cyanosis.  SKIN: no rash on limited exam  NEURO: Mentation intact, speech normal, normal strength and tone, normal gait and stance  PSYCH: mentation appears normal. and affect normal/bright  Results: I have reviewed all imaging, PFTs and other relavent tests, please see below for details, PFT and imaging results were reviewed with the patient.    Assessment and plan:          CBC   Recent Labs   Lab Test 10/25/23  1521 09/20/23  0916   RBC 5.38* 5.30*   HGB 12.4 11.1*   HCT 39.3 36.9    307       Basic Metabolic Panel  Recent Labs   Lab Test 10/25/23  1521 09/20/23  0916    139   POTASSIUM 4.2 4.2   CHLORIDE 105 105   CO2 20* 25   BUN 9.6 6.5   GLC 87 92   SELMA 9.8 9.4       INR  Recent " Labs   Lab Test 08/20/23  1157   INR 0.99       PFT      Latest Ref Rng & Units 12/18/2023    12:05 PM   PFT   FVC L 3.25    FEV1 L 2.07    FVC% % 89    FEV1% % 69            CC:

## 2024-01-02 NOTE — NURSING NOTE
"Chief Complaint   Patient presents with    New Patient     Sarcoidosis +1 more       Vitals:    01/02/24 1343   BP: 108/68   BP Location: Left arm   Patient Position: Sitting   Cuff Size: Adult Regular   Pulse: 91   Resp: 18   SpO2: 98%   Height: 1.67 m (5' 5.75\")       Body mass index is 26.64 kg/m .    Kerri Yañez, ICVF  "

## 2024-01-02 NOTE — PROGRESS NOTES
Reason for Visit  Tasneem Hogan is a 43 year old year old female who is being seen for New Patient (Sarcoidosis +1 more)    ILD HPI    Tasneem Hogan is a 43-year-old female who is seen today for history of pulmonary sarcoidosis.  She has a fairly distant history of sarcoidosis diagnosed in 2010 via transbronchial biopsy.  At that time she was treated with a course of prednisone though it does sound like there were some issues with compliance.  She says she was treated for about 1 year.  She has not been seen by pulmonary in our system since 2012.  At that time she was doing well and not on any therapy and the plan was just to observe her.  She returns to clinic today mainly to establish care to make sure she is following up on all her medical issues.  Of note there had been some concern of eye involvement of her sarcoidosis she was followed by ophthalmology for period of time though it is unclear if she was ever treated specifically for this.  She says that she did see an ophthalmologist about a year and a half ago and everything was fine at that time.  She did also have an episode of COVID in October 2023.  Otherwise she is not that symptomatic from a respiratory standpoint.  She is a current smoker and does occasionally have episodes of what sound like perhaps COPD exacerbations with some increasing cough and shortness of breath.  These typically persist for about 2 weeks and resolve on their own.  She does not seek treatment for these.  She does have an albuterol inhaler which she uses and notes that they help with particular during these episodes.  She has been previously on a LABA corticosteroid inhaler though she has not been on this for several years.  Otherwise she denies fevers chills night sweats or weight loss heart palpitations or new skin rashes.    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  She is a current tobacco smoker  and uses THC.  She states that she mostly vapes the THC now that she has smoked in the past.  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      Current Outpatient Medications   Medication    naltrexone (DEPADE/REVIA) 50 MG tablet    albuterol (PROAIR HFA/PROVENTIL HFA/VENTOLIN HFA) 108 (90 Base) MCG/ACT inhaler    ferrous sulfate (FEROSUL) 325 (65 Fe) MG tablet     No current facility-administered medications for this visit.     Allergies   Allergen Reactions    Nkda [No Known Drug Allergy]     Prozac [Fluoxetine] Other (See Comments)     Dissociation      Past Medical History:   Diagnosis Date    HSIL (high grade squamous intraepithelial lesion) on Pap smear 09/01/2008    leep 11/09 Harriet II & III    Normal delivery 12/19/2000    Osteoporosis     Sarcoidosis 01/01/2010    Sickle-cell trait (H24)     Special screening examination for chlamydial disease     Tobacco abuse 09/24/2008    Unspecified chlamydial infection, in conditions classified elsewhere and of unspecified site 03/01/2001    Unspecified inflammatory disease of female pelvic organs and tissues 03/01/2001       Past Surgical History:   Procedure Laterality Date    LEEP TX, CERVICAL  2009    harriet II & III    [facility] NONSPECIFIC PROCEDURE      - 2 children Male 12-00, Female '93       Social History     Socioeconomic History    Marital status: Legally      Spouse name: FERNANDA ARGUETA    Number of children: 2    Years of education: Not on file    Highest education level: Not on file   Occupational History    Occupation:    Tobacco Use    Smoking status: Every Day     Packs/day: 1.25     Years: 25.00     Additional pack years: 0.00     Total pack years: 31.25     Types: Cigarettes    Smokeless tobacco: Never   Vaping Use    Vaping Use: Former   Substance and Sexual Activity    Alcohol use: Not Currently     Comment: hx alcohol abuse, sober x2 months as of 10/2023    Drug use: Not Currently    Sexual activity: Yes      Partners: Male   Other Topics Concern     Service No    Blood Transfusions No    Caffeine Concern Not Asked    Occupational Exposure Not Asked    Hobby Hazards Not Asked    Sleep Concern Not Asked    Stress Concern Not Asked    Weight Concern Not Asked    Special Diet Not Asked    Back Care Not Asked    Exercise Not Asked    Bike Helmet Not Asked    Seat Belt Not Asked    Self-Exams Not Asked    Parent/sibling w/ CABG, MI or angioplasty before 65F 55M? Yes     Comment: dad with heart attack at unknown age   Social History Narrative    Not on file     Social Determinants of Health     Financial Resource Strain: Low Risk  (11/9/2023)    Financial Resource Strain     Within the past 12 months, have you or your family members you live with been unable to get utilities (heat, electricity) when it was really needed?: No   Food Insecurity: High Risk (11/9/2023)    Food Insecurity     Within the past 12 months, did you worry that your food would run out before you got money to buy more?: Yes     Within the past 12 months, did the food you bought just not last and you didn t have money to get more?: Yes   Transportation Needs: Low Risk  (11/9/2023)    Transportation Needs     Within the past 12 months, has lack of transportation kept you from medical appointments, getting your medicines, non-medical meetings or appointments, work, or from getting things that you need?: No   Physical Activity: Not on file   Stress: Not on file   Social Connections: Not on file   Interpersonal Safety: Low Risk  (11/9/2023)    Interpersonal Safety     Do you feel physically and emotionally safe where you currently live?: Yes     Within the past 12 months, have you been hit, slapped, kicked or otherwise physically hurt by someone?: No     Within the past 12 months, have you been humiliated or emotionally abused in other ways by your partner or ex-partner?: No   Housing Stability: High Risk (11/9/2023)    Housing Stability     Do you  "have housing? : Yes     Are you worried about losing your housing?: Yes       Family History   Problem Relation Age of Onset    Cerebrovascular Disease Father     Heart Disease Father     Heart Disease Daughter     No Known Problems Son     Other - See Comments Maternal Aunt         cancer       ROS Pulmonary    A complete ROS was otherwise negative except as noted in the HPI.  Vitals:    01/02/24 1343   BP: 108/68   BP Location: Left arm   Patient Position: Sitting   Cuff Size: Adult Regular   Pulse: 91   Resp: 18   SpO2: 98%   Height: 1.67 m (5' 5.75\")     Exam:   GENERAL APPEARANCE: Well developed, well nourished, alert, and in no apparent distress.  NECK: supple, no masses, no thyromegaly.  LYMPHATICS: No significant axillary, cervical, or supraclavicular nodes.  RESP: good air flow throughout, - no crackles, rhonchi or wheezes.  CV: Normal S1, S2, regular rhythm, normal rate, no rub, no murmur,  no gallop, no LE edema.   ABDOMEN:  Bowel sounds normal, soft, nontender, no HSM or masses.   MS: extremities normal- no clubbing, no cyanosis.  PSYCH: mentation appears normal. and affect normal/bright  Results: I have reviewed all imaging, PFTs and other relavent tests, please see below for details, PFT and imaging results were reviewed with the patient.  PFTs: Mild obstruction with normal total lung capacity and normal diffusion.  CT scan of the chest (reviewed by me) demonstrates a few scattered nodules that were present in her previous CT scans from 10 years ago that have decreased.  No other significant parenchymal involvement.    Assessment and plan:    43-year-old female with a history of pulmonary sarcoidosis.  At this point there does not seem to be any evidence of active pulmonary sarcoid her CT scan is essentially clear and her pulmonary function tests are normal and have not changed in the past 10 years.  Her respiratory symptoms are likely due to airways disease possibly some mild COPD/chronic bronchitis " related to her smoking.  We did have a discussion about the importance of smoking cessation and she is working on this.  Otherwise from the standpoint of her sarcoidosis I do not think she needs any further evaluation at this time.  She did have some small pulmonary nodules that may need a follow-up CT scan in 1 year however unless she develops any new symptoms I do not think she needs any further evaluation.  I will see her back in 1 year with pulmonary function test.  I have instructed her to call us though if she does develop any new respiratory symptoms.    I spent 60 minutes on the date of the encounter doing chart review, history and exam, interpretation of any new PFTs and imaging, documentation and further activities as noted above.      CBC   Recent Labs   Lab Test 10/25/23  1521 09/20/23  0916   RBC 5.38* 5.30*   HGB 12.4 11.1*   HCT 39.3 36.9    307       Basic Metabolic Panel  Recent Labs   Lab Test 10/25/23  1521 09/20/23  0916    139   POTASSIUM 4.2 4.2   CHLORIDE 105 105   CO2 20* 25   BUN 9.6 6.5   GLC 87 92   SELMA 9.8 9.4       INR  Recent Labs   Lab Test 08/20/23  1157   INR 0.99       PFT      Latest Ref Rng & Units 12/18/2023    12:05 PM   PFT   FVC L 3.25    FEV1 L 2.07    FVC% % 89    FEV1% % 69            CC:

## 2024-01-02 NOTE — LETTER
1/2/2024         RE: Tasneem Hogan  [address] [place] [address]        Dear Colleague,    Thank you for referring your patient, Tasneem Hogan, to the [facility] [place]. Please see a copy of my visit note below.    Reason for Visit  Tasneem Hogan is a 43 year old year old female who is being seen for New Patient (Sarcoidosis +1 more)    ILD HPI    Tasneem Hogan is a 43-year-old female who is seen today for history of pulmonary sarcoidosis.  She has a fairly distant history of sarcoidosis diagnosed in 2010 via transbronchial biopsy.  At that time she was treated with a course of prednisone though it does sound like there were some issues with compliance.  She says she was treated for about 1 year.  She has not been seen by pulmonary in our system since 2012.  At that time she was doing well and not on any therapy and the plan was just to observe her.  She returns to clinic today mainly to establish care to make sure she is following up on all her medical issues.  Of note there had been some concern of eye involvement of her sarcoidosis she was followed by ophthalmology for period of time though it is unclear if she was ever treated specifically for this.  She says that she did see an ophthalmologist about a year and a half ago and everything was fine at that time.  She did also have an episode of COVID in October 2023.  Otherwise she is not that symptomatic from a respiratory standpoint.  She is a current smoker and does occasionally have episodes of what sound like perhaps COPD exacerbations with some increasing cough and shortness of breath.  These typically persist for about 2 weeks and resolve on their own.  She does not seek treatment for these.  She does have an albuterol inhaler which she uses and notes that they help with particular during these episodes.  She has been previously on a LABA corticosteroid inhaler though she has not been on  this for several years.  Otherwise she denies fevers chills night sweats or weight loss heart palpitations or new skin rashes.    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  She is a current tobacco smoker and uses THC.  She states that she mostly vapes the THC now that she has smoked in the past.  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      Current Outpatient Medications   Medication    naltrexone (DEPADE/REVIA) 50 MG tablet    albuterol (PROAIR HFA/PROVENTIL HFA/VENTOLIN HFA) 108 (90 Base) MCG/ACT inhaler    ferrous sulfate (FEROSUL) 325 (65 Fe) MG tablet     No current facility-administered medications for this visit.     Allergies   Allergen Reactions    Nkda [No Known Drug Allergy]     Prozac [Fluoxetine] Other (See Comments)     Dissociation      Past Medical History:   Diagnosis Date    HSIL (high grade squamous intraepithelial lesion) on Pap smear 09/01/2008    leep 11/09 Harriet II & III    Normal delivery 12/19/2000    Osteoporosis     Sarcoidosis 01/01/2010    Sickle-cell trait (H24)     Special screening examination for chlamydial disease     Tobacco abuse 09/24/2008    Unspecified chlamydial infection, in conditions classified elsewhere and of unspecified site 03/01/2001    Unspecified inflammatory disease of female pelvic organs and tissues 03/01/2001       Past Surgical History:   Procedure Laterality Date    LEEP TX, CERVICAL  2009    harriet II & III    [facility] NONSPECIFIC PROCEDURE      - 2 children Male 12-00, Female '93       Social History     Socioeconomic History    Marital status: Legally      Spouse name: FERNANDA ARGUETA    Number of children: 2    Years of education: Not on file    Highest education level: Not on file   Occupational History    Occupation:    Tobacco Use    Smoking status: Every Day     Packs/day: 1.25     Years: 25.00     Additional pack years: 0.00     Total pack  years: 31.25     Types: Cigarettes    Smokeless tobacco: Never   Vaping Use    Vaping Use: Former   Substance and Sexual Activity    Alcohol use: Not Currently     Comment: hx alcohol abuse, sober x2 months as of 10/2023    Drug use: Not Currently    Sexual activity: Yes     Partners: Male   Other Topics Concern     Service No    Blood Transfusions No    Caffeine Concern Not Asked    Occupational Exposure Not Asked    Hobby Hazards Not Asked    Sleep Concern Not Asked    Stress Concern Not Asked    Weight Concern Not Asked    Special Diet Not Asked    Back Care Not Asked    Exercise Not Asked    Bike Helmet Not Asked    Seat Belt Not Asked    Self-Exams Not Asked    Parent/sibling w/ CABG, MI or angioplasty before 65F 55M? Yes     Comment: dad with heart attack at unknown age   Social History Narrative    Not on file     Social Determinants of Health     Financial Resource Strain: Low Risk  (11/9/2023)    Financial Resource Strain     Within the past 12 months, have you or your family members you live with been unable to get utilities (heat, electricity) when it was really needed?: No   Food Insecurity: High Risk (11/9/2023)    Food Insecurity     Within the past 12 months, did you worry that your food would run out before you got money to buy more?: Yes     Within the past 12 months, did the food you bought just not last and you didn t have money to get more?: Yes   Transportation Needs: Low Risk  (11/9/2023)    Transportation Needs     Within the past 12 months, has lack of transportation kept you from medical appointments, getting your medicines, non-medical meetings or appointments, work, or from getting things that you need?: No   Physical Activity: Not on file   Stress: Not on file   Social Connections: Not on file   Interpersonal Safety: Low Risk  (11/9/2023)    Interpersonal Safety     Do you feel physically and emotionally safe where you currently live?: Yes     Within the past 12 months, have you  "been hit, slapped, kicked or otherwise physically hurt by someone?: No     Within the past 12 months, have you been humiliated or emotionally abused in other ways by your partner or ex-partner?: No   Housing Stability: High Risk (11/9/2023)    Housing Stability     Do you have housing? : Yes     Are you worried about losing your housing?: Yes       Family History   Problem Relation Age of Onset    Cerebrovascular Disease Father     Heart Disease Father     Heart Disease Daughter     No Known Problems Son     Other - See Comments Maternal Aunt         cancer       ROS Pulmonary    A complete ROS was otherwise negative except as noted in the HPI.  Vitals:    01/02/24 1343   BP: 108/68   BP Location: Left arm   Patient Position: Sitting   Cuff Size: Adult Regular   Pulse: 91   Resp: 18   SpO2: 98%   Height: 1.67 m (5' 5.75\")     Exam:   GENERAL APPEARANCE: Well developed, well nourished, alert, and in no apparent distress.  NECK: supple, no masses, no thyromegaly.  LYMPHATICS: No significant axillary, cervical, or supraclavicular nodes.  RESP: good air flow throughout, - no crackles, rhonchi or wheezes.  CV: Normal S1, S2, regular rhythm, normal rate, no rub, no murmur,  no gallop, no LE edema.   ABDOMEN:  Bowel sounds normal, soft, nontender, no HSM or masses.   MS: extremities normal- no clubbing, no cyanosis.  PSYCH: mentation appears normal. and affect normal/bright  Results: I have reviewed all imaging, PFTs and other relavent tests, please see below for details, PFT and imaging results were reviewed with the patient.  PFTs: Mild obstruction with normal total lung capacity and normal diffusion.  CT scan of the chest (reviewed by me) demonstrates a few scattered nodules that were present in her previous CT scans from 10 years ago that have decreased.  No other significant parenchymal involvement.    Assessment and plan:    43-year-old female with a history of pulmonary sarcoidosis.  At this point there does not " seem to be any evidence of active pulmonary sarcoid her CT scan is essentially clear and her pulmonary function tests are normal and have not changed in the past 10 years.  Her respiratory symptoms are likely due to airways disease possibly some mild COPD/chronic bronchitis related to her smoking.  We did have a discussion about the importance of smoking cessation and she is working on this.  Otherwise from the standpoint of her sarcoidosis I do not think she needs any further evaluation at this time.  She did have some small pulmonary nodules that may need a follow-up CT scan in 1 year however unless she develops any new symptoms I do not think she needs any further evaluation.  I will see her back in 1 year with pulmonary function test.  I have instructed her to call us though if she does develop any new respiratory symptoms.    I spent 60 minutes on the date of the encounter doing chart review, history and exam, interpretation of any new PFTs and imaging, documentation and further activities as noted above.      CBC   Recent Labs   Lab Test 10/25/23  1521 09/20/23  0916   RBC 5.38* 5.30*   HGB 12.4 11.1*   HCT 39.3 36.9    307       Basic Metabolic Panel  Recent Labs   Lab Test 10/25/23  1521 09/20/23  0916    139   POTASSIUM 4.2 4.2   CHLORIDE 105 105   CO2 20* 25   BUN 9.6 6.5   GLC 87 92   SELMA 9.8 9.4       INR  Recent Labs   Lab Test 08/20/23  1157   INR 0.99       PFT      Latest Ref Rng & Units 12/18/2023    12:05 PM   PFT   FVC L 3.25    FEV1 L 2.07    FVC% % 89    FEV1% % 69            CC:        Reason for Visit  Tasneem Hogan is a 43 year old year old female who is being seen for New Patient (Sarcoidosis +1 more)    ILD HPI    Tasneem Hogan      Current Outpatient Medications   Medication    naltrexone (DEPADE/REVIA) 50 MG tablet    albuterol (PROAIR HFA/PROVENTIL HFA/VENTOLIN HFA) 108 (90 Base) MCG/ACT inhaler    ferrous sulfate (FEROSUL) 325 (65 Fe) MG tablet     No  current facility-administered medications for this visit.     Allergies   Allergen Reactions    Nkda [No Known Drug Allergy]     Prozac [Fluoxetine] Other (See Comments)     Dissociation      Past Medical History:   Diagnosis Date    HSIL (high grade squamous intraepithelial lesion) on Pap smear 09/01/2008    leep 11/09 Harriet II & III    Normal delivery 12/19/2000    Osteoporosis     Sarcoidosis 01/01/2010    Sickle-cell trait (H24)     Special screening examination for chlamydial disease     Tobacco abuse 09/24/2008    Unspecified chlamydial infection, in conditions classified elsewhere and of unspecified site 03/01/2001    Unspecified inflammatory disease of female pelvic organs and tissues 03/01/2001       Past Surgical History:   Procedure Laterality Date    LEEP TX, CERVICAL  2009    harriet II & III    [facility] NONSPECIFIC PROCEDURE      - 2 children Male 12-00, Female '93       Social History     Socioeconomic History    Marital status: Legally      Spouse name: FERNANDA ARGUETA    Number of children: 2    Years of education: Not on file    Highest education level: Not on file   Occupational History    Occupation:    Tobacco Use    Smoking status: Every Day     Packs/day: 1.25     Years: 25.00     Additional pack years: 0.00     Total pack years: 31.25     Types: Cigarettes    Smokeless tobacco: Never   Vaping Use    Vaping Use: Former   Substance and Sexual Activity    Alcohol use: Not Currently     Comment: hx alcohol abuse, sober x2 months as of 10/2023    Drug use: Not Currently    Sexual activity: Yes     Partners: Male   Other Topics Concern     Service No    Blood Transfusions No    Caffeine Concern Not Asked    Occupational Exposure Not Asked    Hobby Hazards Not Asked    Sleep Concern Not Asked    Stress Concern Not Asked    Weight Concern Not Asked    Special Diet Not Asked    Back Care Not Asked    Exercise Not Asked    Bike Helmet Not Asked    Seat Belt Not Asked     Self-Exams Not Asked    Parent/sibling w/ CABG, MI or angioplasty before 65F 55M? Yes     Comment: dad with heart attack at unknown age   Social History Narrative    Not on file     Social Determinants of Health     Financial Resource Strain: Low Risk  (11/9/2023)    Financial Resource Strain     Within the past 12 months, have you or your family members you live with been unable to get utilities (heat, electricity) when it was really needed?: No   Food Insecurity: High Risk (11/9/2023)    Food Insecurity     Within the past 12 months, did you worry that your food would run out before you got money to buy more?: Yes     Within the past 12 months, did the food you bought just not last and you didn t have money to get more?: Yes   Transportation Needs: Low Risk  (11/9/2023)    Transportation Needs     Within the past 12 months, has lack of transportation kept you from medical appointments, getting your medicines, non-medical meetings or appointments, work, or from getting things that you need?: No   Physical Activity: Not on file   Stress: Not on file   Social Connections: Not on file   Interpersonal Safety: Low Risk  (11/9/2023)    Interpersonal Safety     Do you feel physically and emotionally safe where you currently live?: Yes     Within the past 12 months, have you been hit, slapped, kicked or otherwise physically hurt by someone?: No     Within the past 12 months, have you been humiliated or emotionally abused in other ways by your partner or ex-partner?: No   Housing Stability: High Risk (11/9/2023)    Housing Stability     Do you have housing? : Yes     Are you worried about losing your housing?: Yes       Family History   Problem Relation Age of Onset    Cerebrovascular Disease Father     Heart Disease Father     Heart Disease Daughter     No Known Problems Son     Other - See Comments Maternal Aunt         cancer       ROS Pulmonary    A complete ROS was otherwise negative except as noted in the  "HPI.  Vitals:    01/02/24 1343   BP: 108/68   BP Location: Left arm   Patient Position: Sitting   Cuff Size: Adult Regular   Pulse: 91   Resp: 18   SpO2: 98%   Height: 1.67 m (5' 5.75\")     Exam:   GENERAL APPEARANCE: Well developed, well nourished, alert, and in no apparent distress.  EYES: PERRL, EOMI  HENT: nasal mucosa with out hyperemia or edema, no nasal polyps.  MOUTH: Oral mucosa is moist, without any lesions, no tonsillar enlargement, no oropharyngeal exudate.  NECK: supple, no masses, no thyromegaly.  LYMPHATICS: No significant axillary, cervical, or supraclavicular nodes.  RESP: good air flow throughout, - no crackles, rhonchi or wheezes.  CV: Normal S1, S2, regular rhythm, normal rate, no rub, no murmur,  no gallop, no LE edema.   ABDOMEN:  Bowel sounds normal, soft, nontender, no HSM or masses.   MS: extremities normal- no clubbing, no cyanosis.  SKIN: no rash on limited exam  NEURO: Mentation intact, speech normal, normal strength and tone, normal gait and stance  PSYCH: mentation appears normal. and affect normal/bright  Results: I have reviewed all imaging, PFTs and other relavent tests, please see below for details, PFT and imaging results were reviewed with the patient.    Assessment and plan:          CBC   Recent Labs   Lab Test 10/25/23  1521 09/20/23  0916   RBC 5.38* 5.30*   HGB 12.4 11.1*   HCT 39.3 36.9    307       Basic Metabolic Panel  Recent Labs   Lab Test 10/25/23  1521 09/20/23  0916    139   POTASSIUM 4.2 4.2   CHLORIDE 105 105   CO2 20* 25   BUN 9.6 6.5   GLC 87 92   SELMA 9.8 9.4       INR  Recent Labs   Lab Test 08/20/23  1157   INR 0.99       PFT      Latest Ref Rng & Units 12/18/2023    12:05 PM   PFT   FVC L 3.25    FEV1 L 2.07    FVC% % 89    FEV1% % 69          Sincerely,        David Morris Perlman, MD  "

## 2024-01-04 ENCOUNTER — MYC REFILL (OUTPATIENT)
Dept: FAMILY MEDICINE | Facility: CLINIC | Age: 44
End: 2024-01-04
Payer: COMMERCIAL

## 2024-01-04 DIAGNOSIS — F10.11 HISTORY OF ALCOHOL ABUSE: Primary | ICD-10-CM

## 2024-01-05 RX ORDER — NALTREXONE HYDROCHLORIDE 50 MG/1
50 TABLET, FILM COATED ORAL AT BEDTIME
Qty: 30 TABLET | Refills: 4 | Status: SHIPPED | OUTPATIENT
Start: 2024-01-05

## 2024-02-05 ENCOUNTER — MYC REFILL (OUTPATIENT)
Dept: EMERGENCY MEDICINE | Facility: CLINIC | Age: 44
End: 2024-02-05
Payer: COMMERCIAL

## 2024-02-05 ENCOUNTER — MYC REFILL (OUTPATIENT)
Dept: FAMILY MEDICINE | Facility: CLINIC | Age: 44
End: 2024-02-05
Payer: COMMERCIAL

## 2024-02-05 DIAGNOSIS — F10.11 HISTORY OF ALCOHOL ABUSE: ICD-10-CM

## 2024-02-05 DIAGNOSIS — D50.9 IRON DEFICIENCY ANEMIA, UNSPECIFIED IRON DEFICIENCY ANEMIA TYPE: Primary | ICD-10-CM

## 2024-02-05 RX ORDER — FERROUS SULFATE 325(65) MG
325 TABLET ORAL
Qty: 30 TABLET | Refills: 1 | OUTPATIENT
Start: 2024-02-05

## 2024-02-06 RX ORDER — NALTREXONE HYDROCHLORIDE 50 MG/1
50 TABLET, FILM COATED ORAL AT BEDTIME
Qty: 30 TABLET | Refills: 4 | OUTPATIENT
Start: 2024-02-06

## 2024-02-07 RX ORDER — FERROUS SULFATE 325(65) MG
325 TABLET ORAL
Qty: 90 TABLET | Refills: 3 | Status: SHIPPED | OUTPATIENT
Start: 2024-02-07

## 2024-10-07 ENCOUNTER — PATIENT OUTREACH (OUTPATIENT)
Dept: OBGYN | Facility: CLINIC | Age: 44
End: 2024-10-07
Payer: COMMERCIAL

## 2024-10-07 DIAGNOSIS — R87.613 HSIL ON PAP SMEAR OF CERVIX: Primary | ICD-10-CM

## 2024-12-28 ENCOUNTER — HEALTH MAINTENANCE LETTER (OUTPATIENT)
Age: 44
End: 2024-12-28

## 2025-06-05 ENCOUNTER — TELEPHONE (OUTPATIENT)
Dept: PULMONOLOGY | Facility: CLINIC | Age: 45
End: 2025-06-05
Payer: COMMERCIAL

## 2025-06-05 NOTE — TELEPHONE ENCOUNTER
Left Voicemail (1st Attempt) and Sent Mychart (1st Attempt) for the patient to call back and schedule the following:    Appointment type: PFT  Provider: Perlman  Return date: 7/1/2025  Specialty phone number: [phone]  Additional appointment(s) needed: [facility]  Additonal Notes: NA